# Patient Record
Sex: MALE | Race: WHITE | Employment: OTHER | URBAN - METROPOLITAN AREA
[De-identification: names, ages, dates, MRNs, and addresses within clinical notes are randomized per-mention and may not be internally consistent; named-entity substitution may affect disease eponyms.]

---

## 2022-09-16 ENCOUNTER — APPOINTMENT (OUTPATIENT)
Dept: GENERAL RADIOLOGY | Age: 82
DRG: 286 | End: 2022-09-16
Attending: EMERGENCY MEDICINE
Payer: MEDICARE

## 2022-09-16 ENCOUNTER — HOSPITAL ENCOUNTER (INPATIENT)
Age: 82
LOS: 5 days | Discharge: HOME | DRG: 286 | End: 2022-09-21
Attending: EMERGENCY MEDICINE | Admitting: INTERNAL MEDICINE
Payer: MEDICARE

## 2022-09-16 ENCOUNTER — APPOINTMENT (OUTPATIENT)
Dept: ULTRASOUND IMAGING | Age: 82
DRG: 286 | End: 2022-09-16
Attending: INTERNAL MEDICINE
Payer: MEDICARE

## 2022-09-16 DIAGNOSIS — I48.91 A-FIB (HCC): ICD-10-CM

## 2022-09-16 DIAGNOSIS — I48.91 ATRIAL FIBRILLATION WITH RAPID VENTRICULAR RESPONSE (HCC): Primary | ICD-10-CM

## 2022-09-16 DIAGNOSIS — I42.9 CARDIOMYOPATHY, UNSPECIFIED TYPE (HCC): ICD-10-CM

## 2022-09-16 LAB
ALBUMIN SERPL-MCNC: 3.8 G/DL (ref 3.5–5)
ALBUMIN/GLOB SERPL: 1.1 {RATIO} (ref 1.1–2.2)
ALP SERPL-CCNC: 82 U/L (ref 45–117)
ALT SERPL-CCNC: 249 U/L (ref 12–78)
ANION GAP SERPL CALC-SCNC: 8 MMOL/L (ref 5–15)
AST SERPL-CCNC: 86 U/L (ref 15–37)
BASOPHILS # BLD: 0.1 K/UL (ref 0–0.1)
BASOPHILS NFR BLD: 1 % (ref 0–1)
BILIRUB SERPL-MCNC: 1.2 MG/DL (ref 0.2–1)
BNP SERPL-MCNC: 3167 PG/ML
BUN SERPL-MCNC: 14 MG/DL (ref 6–20)
BUN/CREAT SERPL: 11 (ref 12–20)
CALCIUM SERPL-MCNC: 9.4 MG/DL (ref 8.5–10.1)
CHLORIDE SERPL-SCNC: 109 MMOL/L (ref 97–108)
CO2 SERPL-SCNC: 21 MMOL/L (ref 21–32)
COMMENT, HOLDF: NORMAL
COVID-19 RAPID TEST, COVR: NOT DETECTED
CREAT SERPL-MCNC: 1.28 MG/DL (ref 0.7–1.3)
DIFFERENTIAL METHOD BLD: ABNORMAL
EOSINOPHIL # BLD: 0.1 K/UL (ref 0–0.4)
EOSINOPHIL NFR BLD: 1 % (ref 0–7)
ERYTHROCYTE [DISTWIDTH] IN BLOOD BY AUTOMATED COUNT: 14.9 % (ref 11.5–14.5)
GLOBULIN SER CALC-MCNC: 3.6 G/DL (ref 2–4)
GLUCOSE SERPL-MCNC: 131 MG/DL (ref 65–100)
HCT VFR BLD AUTO: 45.1 % (ref 36.6–50.3)
HGB BLD-MCNC: 15.4 G/DL (ref 12.1–17)
IMM GRANULOCYTES # BLD AUTO: 0.1 K/UL (ref 0–0.04)
IMM GRANULOCYTES NFR BLD AUTO: 1 % (ref 0–0.5)
LYMPHOCYTES # BLD: 1.8 K/UL (ref 0.8–3.5)
LYMPHOCYTES NFR BLD: 15 % (ref 12–49)
MAGNESIUM SERPL-MCNC: 2.5 MG/DL (ref 1.6–2.4)
MCH RBC QN AUTO: 32 PG (ref 26–34)
MCHC RBC AUTO-ENTMCNC: 34.1 G/DL (ref 30–36.5)
MCV RBC AUTO: 93.8 FL (ref 80–99)
MONOCYTES # BLD: 1.2 K/UL (ref 0–1)
MONOCYTES NFR BLD: 10 % (ref 5–13)
NEUTS SEG # BLD: 9 K/UL (ref 1.8–8)
NEUTS SEG NFR BLD: 72 % (ref 32–75)
NRBC # BLD: 0 K/UL (ref 0–0.01)
NRBC BLD-RTO: 0 PER 100 WBC
PLATELET # BLD AUTO: 210 K/UL (ref 150–400)
PMV BLD AUTO: 10.8 FL (ref 8.9–12.9)
POTASSIUM SERPL-SCNC: 4 MMOL/L (ref 3.5–5.1)
PROT SERPL-MCNC: 7.4 G/DL (ref 6.4–8.2)
RBC # BLD AUTO: 4.81 M/UL (ref 4.1–5.7)
SAMPLES BEING HELD,HOLD: NORMAL
SODIUM SERPL-SCNC: 138 MMOL/L (ref 136–145)
SOURCE, COVRS: NORMAL
TROPONIN-HIGH SENSITIVITY: 36 NG/L (ref 0–76)
TSH SERPL DL<=0.05 MIU/L-ACNC: 9.66 UIU/ML (ref 0.36–3.74)
WBC # BLD AUTO: 12.1 K/UL (ref 4.1–11.1)

## 2022-09-16 PROCEDURE — 74011250636 HC RX REV CODE- 250/636: Performed by: EMERGENCY MEDICINE

## 2022-09-16 PROCEDURE — 96366 THER/PROPH/DIAG IV INF ADDON: CPT

## 2022-09-16 PROCEDURE — 93005 ELECTROCARDIOGRAM TRACING: CPT

## 2022-09-16 PROCEDURE — 36415 COLL VENOUS BLD VENIPUNCTURE: CPT

## 2022-09-16 PROCEDURE — 83735 ASSAY OF MAGNESIUM: CPT

## 2022-09-16 PROCEDURE — 85025 COMPLETE CBC W/AUTO DIFF WBC: CPT

## 2022-09-16 PROCEDURE — 74011250636 HC RX REV CODE- 250/636: Performed by: INTERNAL MEDICINE

## 2022-09-16 PROCEDURE — 74011000250 HC RX REV CODE- 250: Performed by: INTERNAL MEDICINE

## 2022-09-16 PROCEDURE — 99285 EMERGENCY DEPT VISIT HI MDM: CPT

## 2022-09-16 PROCEDURE — 80053 COMPREHEN METABOLIC PANEL: CPT

## 2022-09-16 PROCEDURE — 83880 ASSAY OF NATRIURETIC PEPTIDE: CPT

## 2022-09-16 PROCEDURE — 87635 SARS-COV-2 COVID-19 AMP PRB: CPT

## 2022-09-16 PROCEDURE — 74011000250 HC RX REV CODE- 250: Performed by: EMERGENCY MEDICINE

## 2022-09-16 PROCEDURE — 71045 X-RAY EXAM CHEST 1 VIEW: CPT

## 2022-09-16 PROCEDURE — 84484 ASSAY OF TROPONIN QUANT: CPT

## 2022-09-16 PROCEDURE — 84443 ASSAY THYROID STIM HORMONE: CPT

## 2022-09-16 PROCEDURE — 96365 THER/PROPH/DIAG IV INF INIT: CPT

## 2022-09-16 PROCEDURE — 96376 TX/PRO/DX INJ SAME DRUG ADON: CPT

## 2022-09-16 PROCEDURE — 76705 ECHO EXAM OF ABDOMEN: CPT

## 2022-09-16 PROCEDURE — 65270000046 HC RM TELEMETRY

## 2022-09-16 RX ORDER — SODIUM CHLORIDE 0.9 % (FLUSH) 0.9 %
5-40 SYRINGE (ML) INJECTION AS NEEDED
Status: DISCONTINUED | OUTPATIENT
Start: 2022-09-16 | End: 2022-09-21 | Stop reason: HOSPADM

## 2022-09-16 RX ORDER — ONDANSETRON 2 MG/ML
4 INJECTION INTRAMUSCULAR; INTRAVENOUS
Status: DISCONTINUED | OUTPATIENT
Start: 2022-09-16 | End: 2022-09-21 | Stop reason: HOSPADM

## 2022-09-16 RX ORDER — SODIUM CHLORIDE 0.9 % (FLUSH) 0.9 %
5-40 SYRINGE (ML) INJECTION EVERY 8 HOURS
Status: DISCONTINUED | OUTPATIENT
Start: 2022-09-16 | End: 2022-09-21 | Stop reason: HOSPADM

## 2022-09-16 RX ORDER — ACETAMINOPHEN 325 MG/1
650 TABLET ORAL
Status: DISCONTINUED | OUTPATIENT
Start: 2022-09-16 | End: 2022-09-21 | Stop reason: HOSPADM

## 2022-09-16 RX ORDER — POLYETHYLENE GLYCOL 3350 17 G/17G
17 POWDER, FOR SOLUTION ORAL DAILY PRN
Status: DISCONTINUED | OUTPATIENT
Start: 2022-09-16 | End: 2022-09-21 | Stop reason: HOSPADM

## 2022-09-16 RX ORDER — DILTIAZEM HYDROCHLORIDE 5 MG/ML
10 INJECTION INTRAVENOUS
Status: COMPLETED | OUTPATIENT
Start: 2022-09-16 | End: 2022-09-16

## 2022-09-16 RX ORDER — ONDANSETRON 4 MG/1
4 TABLET, ORALLY DISINTEGRATING ORAL
Status: DISCONTINUED | OUTPATIENT
Start: 2022-09-16 | End: 2022-09-21 | Stop reason: HOSPADM

## 2022-09-16 RX ORDER — ACETAMINOPHEN 650 MG/1
650 SUPPOSITORY RECTAL
Status: DISCONTINUED | OUTPATIENT
Start: 2022-09-16 | End: 2022-09-21 | Stop reason: HOSPADM

## 2022-09-16 RX ORDER — ENOXAPARIN SODIUM 100 MG/ML
40 INJECTION SUBCUTANEOUS DAILY
Status: DISCONTINUED | OUTPATIENT
Start: 2022-09-17 | End: 2022-09-17

## 2022-09-16 RX ADMIN — DEXTROSE MONOHYDRATE 15 MG/HR: 50 INJECTION, SOLUTION INTRAVENOUS at 22:52

## 2022-09-16 RX ADMIN — DILTIAZEM HYDROCHLORIDE 10 MG: 5 INJECTION, SOLUTION INTRAVENOUS at 18:24

## 2022-09-16 RX ADMIN — SODIUM CHLORIDE, PRESERVATIVE FREE 10 ML: 5 INJECTION INTRAVENOUS at 22:52

## 2022-09-16 RX ADMIN — DEXTROSE MONOHYDRATE 5 MG/HR: 50 INJECTION, SOLUTION INTRAVENOUS at 18:34

## 2022-09-16 RX ADMIN — SODIUM CHLORIDE 1000 ML: 9 INJECTION, SOLUTION INTRAVENOUS at 18:33

## 2022-09-16 NOTE — H&P
Hospitalist Admission Note    NAME: Jolie Rodriguez   :  1940   MRN:  [id number]     Date/Time:  2022 6:45 PM    Patient PCP: None  ______________________________________________________________________  Given the patient's current clinical presentation, I have a high level of concern for decompensation if discharged from the emergency department. Complex decision making was performed, which includes reviewing the patient's available past medical records, laboratory results, and x-ray films. My assessment of this patient's clinical condition and my plan of care is as follows. Assessment / Plan:  Atrial fibrillation with rapid ventricular rate new onset  -Initiate work-up for new onset A. fib  -Start Cardizem drip.  -Consult cardiology. Keep n.p.o. from midnight.  -Magnesium level is 2.5  -TSH level is elevated at 9.6    Elevated TSH concerning for hypothyroidism  -Check free T4 level. Further management based on that    Leukocytosis likely reactive  -Check procalcitonin. Check CBC. Abnormal chest x-ray  -Chest x-ray shows diffuse interstitial prominence which could represent chronic lung changes versus pulmonary congestion versus atypical infectious process  -Follow procalcitonin level. Check SARS-CoV-2. Elevated BNP, rule out congestive heart failure  -proBNP is 3000  -Follow results of echocardiogram        Code Status: Full code  Surrogate Decision Maker: Sister    DVT Prophylaxis: Lovenox      Baseline: From home, independent of ADLs      Subjective:   CHIEF COMPLAINT: Palpitations    HISTORY OF PRESENT ILLNESS:     Jolie Rodriguez is a 81 y.o.  male who presents with no significant past medical history is coming the hospital chief complaints of palpitations.   Patient reports that he has not seen a physician in the last 20 years and started having some palpitations which are irregular, fast etc. the thumping sensation in the chest.  Does not report any shortness of breath, cough or phlegm. Does not report any chest pain. Denies any syncopal episodes. Denies any abdominal pain, nausea or vomiting. On arrival to emergency department, he was noted to have fast heart rate and was in A. fib with RVR. Blood pressure was 148/119. We were asked to admit for work up and evaluation of the above problems. Past medical history  None    Past surgical history  None    Social History     Tobacco Use    Smoking status: Not on file    Smokeless tobacco: Not on file   Substance Use Topics    Alcohol use: Not on file   Denies smoking and socially drinks alcohol    Family history  No CAD in the family    No Known Allergies     Prior to Admission medications    Not on File       REVIEW OF SYSTEMS:     I am not able to complete the review of systems because:    The patient is intubated and sedated    The patient has altered mental status due to his acute medical problems    The patient has baseline aphasia from prior stroke(s)    The patient has baseline dementia and is not reliable historian    The patient is in acute medical distress and unable to provide information           Total of 12 systems reviewed as follows:       POSITIVE= underlined text  Negative = text not underlined  General:  fever, chills, sweats, generalized weakness, weight loss/gain,      loss of appetite   Eyes:    blurred vision, eye pain, loss of vision, double vision  ENT:    rhinorrhea, pharyngitis   Respiratory:   cough, sputum production, SOB, REED, wheezing, pleuritic pain   Cardiology:   chest pain, palpitations, orthopnea, PND, edema, syncope   Gastrointestinal:  abdominal pain , N/V, diarrhea, dysphagia, constipation, bleeding   Genitourinary:  frequency, urgency, dysuria, hematuria, incontinence   Muskuloskeletal :  arthralgia, myalgia, back pain  Hematology:  easy bruising, nose or gum bleeding, lymphadenopathy   Dermatological: rash, ulceration, pruritis, color change / jaundice  Endocrine:   hot flashes or polydipsia   Neurological:  headache, dizziness, confusion, focal weakness, paresthesia,     Speech difficulties, memory loss, gait difficulty  Psychological: Feelings of anxiety, depression, agitation    Objective:   VITALS:    Visit Vitals  BP (!) 158/97   Pulse (!) 138   Temp 97.4 °F (36.3 °C)   Resp (!) 31   Ht 6' (1.829 m)   Wt 111.1 kg (245 lb)   SpO2 (!) 89%   BMI 33.23 kg/m²       PHYSICAL EXAM:    General:    Alert, cooperative, no distress, appears stated age. HEENT: Atraumatic, anicteric sclerae, pink conjunctivae     No oral ulcers, mucosa moist, throat clear, dentition fair  Neck:  Supple, symmetrical,  thyroid: non tender  Lungs:   Clear to auscultation bilaterally. No Wheezing or Rhonchi. No rales. Chest wall:  No tenderness  No Accessory muscle use. Heart:   Irregular rhythm no  murmur   No edema  Abdomen:   Soft, non-tender. Not distended. Bowel sounds normal  Extremities: No cyanosis. No clubbing,      Skin turgor normal, Capillary refill normal, Radial dial pulse 2+  Skin:     Not pale. Not Jaundiced  No rashes   Psych:  Good insight. Not depressed. Not anxious or agitated. Neurologic: EOMs intact. No facial asymmetry. No aphasia or slurred speech. Symmetrical strength, Sensation grossly intact.  Alert and oriented X 4.     _______________________________________________________________________  Care Plan discussed with:    Comments   Patient y    Family      RN y    Care Manager                    Consultant:      _______________________________________________________________________  Expected  Disposition:   Home with Family y   HH/PT/OT/RN    SNF/LTC    YANN    ________________________________________________________________________  TOTAL TIME:  61  Minutes    Critical Care Provided     Minutes non procedure based      Comments    y Reviewed previous records   >50% of visit spent in counseling and coordination of care y Discussion with patient and/or family and questions answered       ________________________________________________________________________  Signed: Toro Hutson MD    Procedures: see electronic medical records for all procedures/Xrays and details which were not copied into this note but were reviewed prior to creation of Plan. LAB DATA REVIEWED:    Recent Results (from the past 24 hour(s))   EKG, 12 LEAD, INITIAL    Collection Time: 09/16/22  6:02 PM   Result Value Ref Range    Ventricular Rate 151 BPM    Atrial Rate 156 BPM    QRS Duration 114 ms    Q-T Interval 334 ms    QTC Calculation (Bezet) 529 ms    Calculated R Axis -12 degrees    Calculated T Axis -9 degrees    Diagnosis       Atrial fibrillation with rapid ventricular response with premature   ventricular or aberrantly conducted complexes  Incomplete right bundle branch block  Inferior infarct , age undetermined  No previous ECGs available     CBC WITH AUTOMATED DIFF    Collection Time: 09/16/22  6:20 PM   Result Value Ref Range    WBC 12.1 (H) 4.1 - 11.1 K/uL    RBC 4.81 4.10 - 5.70 M/uL    HGB 15.4 12.1 - 17.0 g/dL    HCT 45.1 36.6 - 50.3 %    MCV 93.8 80.0 - 99.0 FL    MCH 32.0 26.0 - 34.0 PG    MCHC 34.1 30.0 - 36.5 g/dL    RDW 14.9 (H) 11.5 - 14.5 %    PLATELET 496 933 - 351 K/uL    MPV 10.8 8.9 - 12.9 FL    NRBC 0.0 0  WBC    ABSOLUTE NRBC 0.00 0.00 - 0.01 K/uL    NEUTROPHILS 72 32 - 75 %    LYMPHOCYTES 15 12 - 49 %    MONOCYTES 10 5 - 13 %    EOSINOPHILS 1 0 - 7 %    BASOPHILS 1 0 - 1 %    IMMATURE GRANULOCYTES 1 (H) 0.0 - 0.5 %    ABS. NEUTROPHILS 9.0 (H) 1.8 - 8.0 K/UL    ABS. LYMPHOCYTES 1.8 0.8 - 3.5 K/UL    ABS. MONOCYTES 1.2 (H) 0.0 - 1.0 K/UL    ABS. EOSINOPHILS 0.1 0.0 - 0.4 K/UL    ABS. BASOPHILS 0.1 0.0 - 0.1 K/UL    ABS. IMM.  GRANS. 0.1 (H) 0.00 - 0.04 K/UL    DF AUTOMATED     SAMPLES BEING HELD    Collection Time: 09/16/22  6:20 PM   Result Value Ref Range    SAMPLES BEING HELD SST     COMMENT        Add-on orders for these samples will be processed based on acceptable specimen integrity and analyte stability, which may vary by analyte.

## 2022-09-16 NOTE — Clinical Note
Aspirin Registry Question:   Aspirin was not given and not discussed with physician prior to the procedure.

## 2022-09-16 NOTE — ED NOTES
Arrives via em[facility] for rapid heart rate (120-170). Reports general fatigue this week. Pt has not seen a physician in approx 20 years. EKG by EMS reads a-fib w RVR, no hx of this. Pt denies CP, mild SOB w exertion. Pt placed on monitor x3, call light in reach.     [phone] pt noted to be hypoxic on RA, placed on 2l NC

## 2022-09-16 NOTE — ED NOTES
Bedside and Verbal shift change report given to [address] (oncoming nurse) by Marcelino Torre (offgoing nurse). Report included the following information SBAR, Kardex, ED Summary, [facility] and Recent Results.

## 2022-09-16 NOTE — Clinical Note
TRANSFER - OUT REPORT:     Verbal report given to: [address]. Report consisted of patient's Situation, Background, Assessment and   Recommendations(SBAR). Opportunity for questions and clarification was provided. Patient transported with a Registered Nurse. Patient transported to: [facility].

## 2022-09-16 NOTE — ED PROVIDER NOTES
EMERGENCY DEPARTMENT HISTORY AND PHYSICAL EXAM      Date: 9/16/2022  Patient Name: Ed Cm    History of Presenting Illness     Chief Complaint   Patient presents with    Rapid Heart Rate     Arrives via ems from [facility] for rapid heart rate (120-170). Reports general fatigue this week. Pt has not seen a physician in approx 20 years. EKG by EMS reads a-fib w RVR, no hx of this. Pt denies CP       History Provided By: Patient    HPI: Ed Cm, 81 y.o. male  presents to the ED with cc of generalized weakness and fatigue. Patient states for about a week he has been very fatigued and gets exhausted and tired. He has some shortness of breath with exertion. No chest pain. No palpitations. No nausea vomiting or diarrhea. Patient felt like he may just have a viral illness and presented to urgent care. There they did an EKG that showed new onset atrial fibrillation with a rapid ventricular rate. Patient states he has not seen a PCP in 20 years. He has no known medical problems. Currently not on any medications other than vitamins. Past History     Past Medical History:  No past medical history on file. Past Surgical History:  No past surgical history on file. Medications:  No current facility-administered medications on file prior to encounter. No current outpatient medications on file prior to encounter. Family History:  No family history on file. Social History: Allergies:  No Known Allergies    All the above components of the past  history are auto-populated from the electronic record. They have been reviewed and the patient has been interviewed for any pertinent past history that pertains to the patient's chief complaint and reason for visit. Not all pre-populated components may be accurate at the time this note was generated. Review of Systems   Review of Systems   Constitutional:  Positive for fatigue. Negative for chills and fever.    HENT:  Negative for congestion, ear pain, rhinorrhea, sore throat and trouble swallowing. Eyes:  Negative for visual disturbance. Respiratory:  Positive for shortness of breath. Negative for cough and chest tightness. Cardiovascular:  Negative for chest pain and palpitations. Gastrointestinal:  Negative for abdominal pain, blood in stool, constipation, diarrhea, nausea and vomiting. Genitourinary:  Negative for decreased urine volume, difficulty urinating, dysuria and frequency. Musculoskeletal:  Negative for back pain and neck pain. Skin:  Negative for color change and rash. Neurological:  Positive for weakness. Negative for dizziness, light-headedness and headaches. Physical Exam   Physical Exam  Vitals and nursing note reviewed. Constitutional:       General: He is not in acute distress. Appearance: He is well-developed. He is not ill-appearing. HENT:      Head: Normocephalic. Eyes:      Conjunctiva/sclera: Conjunctivae normal.   Cardiovascular:      Rate and Rhythm: Tachycardia present. Rhythm irregular. Pulmonary:      Effort: Pulmonary effort is normal. No accessory muscle usage or respiratory distress. Abdominal:      General: There is no distension. Musculoskeletal:      Cervical back: Normal range of motion. Skin:     General: Skin is warm and dry. Neurological:      Mental Status: He is alert and oriented to person, place, and time.        Diagnostic Study Results     Labs -     Recent Results (from the past 24 hour(s))   EKG, 12 LEAD, INITIAL    Collection Time: 09/16/22  6:02 PM   Result Value Ref Range    Ventricular Rate 151 BPM    Atrial Rate 156 BPM    QRS Duration 114 ms    Q-T Interval 334 ms    QTC Calculation (Bezet) 529 ms    Calculated R Axis -12 degrees    Calculated T Axis -9 degrees    Diagnosis       Atrial fibrillation with rapid ventricular response with premature   ventricular or aberrantly conducted complexes  Incomplete right bundle branch block  Inferior infarct , age undetermined  No previous ECGs available     CBC WITH AUTOMATED DIFF    Collection Time: 09/16/22  6:20 PM   Result Value Ref Range    WBC 12.1 (H) 4.1 - 11.1 K/uL    RBC 4.81 4.10 - 5.70 M/uL    HGB 15.4 12.1 - 17.0 g/dL    HCT 45.1 36.6 - 50.3 %    MCV 93.8 80.0 - 99.0 FL    MCH 32.0 26.0 - 34.0 PG    MCHC 34.1 30.0 - 36.5 g/dL    RDW 14.9 (H) 11.5 - 14.5 %    PLATELET 607 215 - 594 K/uL    MPV 10.8 8.9 - 12.9 FL    NRBC 0.0 0  WBC    ABSOLUTE NRBC 0.00 0.00 - 0.01 K/uL    NEUTROPHILS 72 32 - 75 %    LYMPHOCYTES 15 12 - 49 %    MONOCYTES 10 5 - 13 %    EOSINOPHILS 1 0 - 7 %    BASOPHILS 1 0 - 1 %    IMMATURE GRANULOCYTES 1 (H) 0.0 - 0.5 %    ABS. NEUTROPHILS 9.0 (H) 1.8 - 8.0 K/UL    ABS. LYMPHOCYTES 1.8 0.8 - 3.5 K/UL    ABS. MONOCYTES 1.2 (H) 0.0 - 1.0 K/UL    ABS. EOSINOPHILS 0.1 0.0 - 0.4 K/UL    ABS. BASOPHILS 0.1 0.0 - 0.1 K/UL    ABS. IMM. GRANS. 0.1 (H) 0.00 - 0.04 K/UL    DF AUTOMATED     METABOLIC PANEL, COMPREHENSIVE    Collection Time: 09/16/22  6:20 PM   Result Value Ref Range    Sodium 138 136 - 145 mmol/L    Potassium 4.0 3.5 - 5.1 mmol/L    Chloride 109 (H) 97 - 108 mmol/L    CO2 21 21 - 32 mmol/L    Anion gap 8 5 - 15 mmol/L    Glucose 131 (H) 65 - 100 mg/dL    BUN 14 6 - 20 MG/DL    Creatinine 1.28 0.70 - 1.30 MG/DL    BUN/Creatinine ratio 11 (L) 12 - 20      GFR est AA >60 >60 ml/min/1.73m2    GFR est non-AA 54 (L) >60 ml/min/1.73m2    Calcium 9.4 8.5 - 10.1 MG/DL    Bilirubin, total 1.2 (H) 0.2 - 1.0 MG/DL    ALT (SGPT) 249 (H) 12 - 78 U/L    AST (SGOT) 86 (H) 15 - 37 U/L    Alk.  phosphatase 82 45 - 117 U/L    Protein, total 7.4 6.4 - 8.2 g/dL    Albumin 3.8 3.5 - 5.0 g/dL    Globulin 3.6 2.0 - 4.0 g/dL    A-G Ratio 1.1 1.1 - 2.2     MAGNESIUM    Collection Time: 09/16/22  6:20 PM   Result Value Ref Range    Magnesium 2.5 (H) 1.6 - 2.4 mg/dL   TSH 3RD GENERATION    Collection Time: 09/16/22  6:20 PM   Result Value Ref Range    TSH 9.66 (H) 0.36 - 3.74 uIU/mL   SAMPLES BEING HELD Collection Time: 09/16/22  6:20 PM   Result Value Ref Range    SAMPLES BEING HELD SST     COMMENT        Add-on orders for these samples will be processed based on acceptable specimen integrity and analyte stability, which may vary by analyte. Radiologic Studies -   XR CHEST PORT   Final Result   1. Diffuse interstitial prominence which could represent chronic lung changes,   pulmonary vascular congestion and/or atypical infectious process. CT Results  (Last 48 hours)      None          CXR Results  (Last 48 hours)                 09/16/22 1857  XR CHEST PORT Final result    Impression:  1. Diffuse interstitial prominence which could represent chronic lung changes,   pulmonary vascular congestion and/or atypical infectious process. Narrative:  INDICATION: . new onset a-fib   Additional history:   COMPARISON: None. LIMITATIONS: Portable technique. Dede Money FINDINGS: Single frontal view of the chest.    .   Lines/tubes/surgical: Cardiac monitor leads overly the patient. Heart/mediastinum: Unremarkable. Lungs/pleura: Diffuse interstitial prominence. No visualized pleural effusion or   pneumothorax. Additional Comments: None. .                 Medical Decision Making     I reviewed the vital signs, available nursing notes, past medical history, past surgical history, family history and social history. Vital Signs-I have reviewed the vital signs that have been made available during the patient's emergency department visit. The vital signs auto-populated below are obtained mostly by electronic means through monitoring devices that have been downloaded into the patient's chart by the nursing staff. Some vital signs are not downloaded into the chart until after the patient has been discharged and this note has been completed, therefore some vital signs may not be available to the physician for review prior to patient's discharge or admission.   The physician has reviewed the patient's triage vital signs, monitored the electronic monitoring devices remotely for any significant vital sign abnormalities, and have reviewed vital signs prior to discharge. Some vital signs reviewed at bedside or remotely utilizing electronic monitoring devices may be different than the vital signs downloaded into the electronic medical record. Some vital signs may be erroneous and inaccurate since they are obtained by electronic monitoring devices, and not all vital signs are verified for accuracy by nursing staff prior to downloading into the patient's chart. Patient Vitals for the past 24 hrs:   Temp Pulse Resp BP SpO2   09/16/22 1915 -- (!) 121 23 -- 91 %   09/16/22 1900 -- (!) 147 27 (!) 159/117 91 %   09/16/22 1850 -- (!) 141 25 (!) 146/103 93 %   09/16/22 1836 -- (!) 138 (!) 31 (!) 158/97 (!) 89 %   09/16/22 1828 -- -- -- -- 91 %   09/16/22 1800 97.4 °F (36.3 °C) (!) 164 20 (!) 148/119 93 %         Records Reviewed: Nursing notes for today's visit have been reviewed. I have also reviewed most recent medical records pertinent to today's complaints, if available in our medical record system. I have also reviewed all labs and imaging results from previous results in comparison to results obtained today. If an EKG was obtained today, it has been compared to previous EKGs, if available. If arriving via EMS, the EMS report has been reviewed if made available to us within the patient's time in the emergency department. ED Course and Medical Decision Making:       MDM  Number of Diagnoses or Management Options  Atrial fibrillation with rapid ventricular response ([facility] [address]  Diagnosis management comments: Patient with no recent primary care presents with fatigue and atrial fibrillation with rapid ventricular rate. Patient's probably been in A. fib for some time. He appears based on his chest imaging and symptoms to maybe have a little bit of heart failure. No signs of ischemia.   We will rate control with diltiazem bolus followed by titratable infusion. Metabolic panel appears within acceptable ranges. No signs of hyperthyroidism. Will admit to hospitalist service with cardiology evaluation tomorrow. Amount and/or Complexity of Data Reviewed  Clinical lab tests: ordered and reviewed  Tests in the radiology section of CPT®: ordered and reviewed  Review and summarize past medical records: yes  Discuss the patient with other providers: yes  Independent visualization of images, tracings, or specimens: yes    Risk of Complications, Morbidity, and/or Mortality  Presenting problems: moderate  Diagnostic procedures: low  Management options: moderate    Patient Progress  Patient progress: improved           Orders Placed This Encounter    XR CHEST PORT    CBC WITH AUTOMATED DIFF    COMPREHENSIVE METABOLIC PANEL    MAGNESIUM    TROPONIN-HIGH SENSITIVITY    TSH 3RD GENERATION    SAMPLES BEING HELD    PRO-BNP    EKG, 12 LEAD, INITIAL    SALINE LOCK IV ONE TIME STAT    sodium chloride 0.9 % bolus infusion 1,000 mL    DISCONTD: dilTIAZem (CARDIZEM) 100 mg in 0.9% sodium chloride (MBP/ADV) 100 mL infusion    dilTIAZem (CARDIZEM) injection 10 mg    dilTIAZem (CARDIZEM) 100 mg in dextrose 5% (MBP/ADV) 100 mL infusion       EKG    Date/Time: 9/16/2022 6:02 PM  Performed by: Nicolas Rawls MD  Authorized by: Nicolas Rawls MD     ECG reviewed by ED Physician in the absence of a cardiologist: yes    Rate:     ECG rate:  151    ECG rate assessment: tachycardic    Rhythm:     Rhythm: atrial fibrillation    Ectopy:     Ectopy: PVCs    QRS:     QRS axis:  Normal    QRS intervals: Wide    QRS conduction: RBBB      Details:  Incomplete right bundle branch block  ST segments:     ST segments:  Normal  T waves:     T waves: non-specific        Critical Care Time:   I have spent 40 minutes of critical care time in evaluating and treating this patient.   This includes time spent at bedside, time with family and decision makers, documentation, review of labs and imaging, and/or consultation with specialists. It does not include time spent on separately billed procedures. This patient presents with a critical illness or injury that acutely impairs one or more vital organ systems such that there is a high probability of imminent or life threatening deterioration in the patient's condition. This case involved decision making of high complexity to assess, manipulate, and support vital organ system failure and/or to prevent further life threatening deterioration of the patient's condition. Failure to initiate these interventions on an urgent basis would likely result in sudden, clinically significant or life threatening deterioration in the patient's condition. Abnormal findings supporting critical care: Atrial fibrillation with rapid ventricular rate  Interventions to support critical care: Rate controlling IV medications, titratable  Failure to intervene may result in: Cardiac failure      Disposition:  Admit      Diagnosis     Clinical Impression:   1.  Atrial fibrillation with rapid ventricular response ([address]

## 2022-09-17 ENCOUNTER — APPOINTMENT (OUTPATIENT)
Dept: CT IMAGING | Age: 82
DRG: 286 | End: 2022-09-17
Attending: GENERAL ACUTE CARE HOSPITAL
Payer: MEDICARE

## 2022-09-17 LAB
ALBUMIN SERPL-MCNC: 3.6 G/DL (ref 3.5–5)
ALBUMIN/GLOB SERPL: 1.1 {RATIO} (ref 1.1–2.2)
ALP SERPL-CCNC: 72 U/L (ref 45–117)
ALT SERPL-CCNC: 209 U/L (ref 12–78)
AMPHET UR QL SCN: NEGATIVE
ANION GAP SERPL CALC-SCNC: 8 MMOL/L (ref 5–15)
APPEARANCE UR: CLEAR
AST SERPL-CCNC: 68 U/L (ref 15–37)
BACTERIA URNS QL MICRO: NEGATIVE /HPF
BARBITURATES UR QL SCN: NEGATIVE
BENZODIAZ UR QL: NEGATIVE
BILIRUB SERPL-MCNC: 1.6 MG/DL (ref 0.2–1)
BILIRUB UR QL: NEGATIVE
BUN SERPL-MCNC: 15 MG/DL (ref 6–20)
BUN/CREAT SERPL: 12 (ref 12–20)
CALCIUM SERPL-MCNC: 9.2 MG/DL (ref 8.5–10.1)
CANNABINOIDS UR QL SCN: NEGATIVE
CHLORIDE SERPL-SCNC: 109 MMOL/L (ref 97–108)
CO2 SERPL-SCNC: 24 MMOL/L (ref 21–32)
COCAINE UR QL SCN: NEGATIVE
COLOR UR: ABNORMAL
CREAT SERPL-MCNC: 1.27 MG/DL (ref 0.7–1.3)
D DIMER PPP FEU-MCNC: 1.93 MG/L FEU (ref 0–0.65)
DRUG SCRN COMMENT,DRGCM: NORMAL
EPITH CASTS URNS QL MICRO: ABNORMAL /LPF
ERYTHROCYTE [DISTWIDTH] IN BLOOD BY AUTOMATED COUNT: 15.2 % (ref 11.5–14.5)
GLOBULIN SER CALC-MCNC: 3.4 G/DL (ref 2–4)
GLUCOSE SERPL-MCNC: 129 MG/DL (ref 65–100)
GLUCOSE UR STRIP.AUTO-MCNC: NEGATIVE MG/DL
HAV IGM SER QL: NONREACTIVE
HBV CORE IGM SER QL: NONREACTIVE
HBV SURFACE AG SER QL: <0.1 INDEX
HBV SURFACE AG SER QL: NEGATIVE
HCT VFR BLD AUTO: 44.4 % (ref 36.6–50.3)
HCV AB SERPL QL IA: NONREACTIVE
HGB BLD-MCNC: 14.8 G/DL (ref 12.1–17)
HGB UR QL STRIP: NEGATIVE
HYALINE CASTS URNS QL MICRO: ABNORMAL /LPF (ref 0–2)
KETONES UR QL STRIP.AUTO: 15 MG/DL
LEUKOCYTE ESTERASE UR QL STRIP.AUTO: NEGATIVE
MAGNESIUM SERPL-MCNC: 2.4 MG/DL (ref 1.6–2.4)
MCH RBC QN AUTO: 31.5 PG (ref 26–34)
MCHC RBC AUTO-ENTMCNC: 33.3 G/DL (ref 30–36.5)
MCV RBC AUTO: 94.5 FL (ref 80–99)
METHADONE UR QL: NEGATIVE
NITRITE UR QL STRIP.AUTO: NEGATIVE
NRBC # BLD: 0 K/UL (ref 0–0.01)
NRBC BLD-RTO: 0 PER 100 WBC
OPIATES UR QL: NEGATIVE
PCP UR QL: NEGATIVE
PH UR STRIP: 5.5 [PH] (ref 5–8)
PLATELET # BLD AUTO: 202 K/UL (ref 150–400)
PMV BLD AUTO: 11.2 FL (ref 8.9–12.9)
POTASSIUM SERPL-SCNC: 3.8 MMOL/L (ref 3.5–5.1)
PROCALCITONIN SERPL-MCNC: 0.08 NG/ML
PROT SERPL-MCNC: 7 G/DL (ref 6.4–8.2)
PROT UR STRIP-MCNC: ABNORMAL MG/DL
RBC # BLD AUTO: 4.7 M/UL (ref 4.1–5.7)
RBC #/AREA URNS HPF: ABNORMAL /HPF (ref 0–5)
SARS-COV-2, COV2: NORMAL
SODIUM SERPL-SCNC: 141 MMOL/L (ref 136–145)
SP GR UR REFRACTOMETRY: 1.01
SP1: NORMAL
SP2: NORMAL
SP3: NORMAL
T4 FREE SERPL-MCNC: 1.1 NG/DL (ref 0.8–1.5)
TROPONIN-HIGH SENSITIVITY: 45 NG/L (ref 0–76)
UA: UC IF INDICATED,UAUC: ABNORMAL
UROBILINOGEN UR QL STRIP.AUTO: 0.2 EU/DL (ref 0.2–1)
WBC # BLD AUTO: 15.9 K/UL (ref 4.1–11.1)
WBC URNS QL MICRO: ABNORMAL /HPF (ref 0–4)

## 2022-09-17 PROCEDURE — 74011250637 HC RX REV CODE- 250/637: Performed by: STUDENT IN AN ORGANIZED HEALTH CARE EDUCATION/TRAINING PROGRAM

## 2022-09-17 PROCEDURE — 80053 COMPREHEN METABOLIC PANEL: CPT

## 2022-09-17 PROCEDURE — U0005 INFEC AGEN DETEC AMPLI PROBE: HCPCS

## 2022-09-17 PROCEDURE — 85379 FIBRIN DEGRADATION QUANT: CPT

## 2022-09-17 PROCEDURE — 84439 ASSAY OF FREE THYROXINE: CPT

## 2022-09-17 PROCEDURE — 80074 ACUTE HEPATITIS PANEL: CPT

## 2022-09-17 PROCEDURE — 84145 PROCALCITONIN (PCT): CPT

## 2022-09-17 PROCEDURE — 65270000046 HC RM TELEMETRY

## 2022-09-17 PROCEDURE — 36415 COLL VENOUS BLD VENIPUNCTURE: CPT

## 2022-09-17 PROCEDURE — 74011250636 HC RX REV CODE- 250/636: Performed by: INTERNAL MEDICINE

## 2022-09-17 PROCEDURE — 77010033678 HC OXYGEN DAILY

## 2022-09-17 PROCEDURE — 84484 ASSAY OF TROPONIN QUANT: CPT

## 2022-09-17 PROCEDURE — 74011250636 HC RX REV CODE- 250/636: Performed by: STUDENT IN AN ORGANIZED HEALTH CARE EDUCATION/TRAINING PROGRAM

## 2022-09-17 PROCEDURE — 81001 URINALYSIS AUTO W/SCOPE: CPT

## 2022-09-17 PROCEDURE — 80307 DRUG TEST PRSMV CHEM ANLYZR: CPT

## 2022-09-17 PROCEDURE — 85027 COMPLETE CBC AUTOMATED: CPT

## 2022-09-17 PROCEDURE — 74011000250 HC RX REV CODE- 250: Performed by: INTERNAL MEDICINE

## 2022-09-17 PROCEDURE — 74011250636 HC RX REV CODE- 250/636: Performed by: GENERAL ACUTE CARE HOSPITAL

## 2022-09-17 PROCEDURE — 83735 ASSAY OF MAGNESIUM: CPT

## 2022-09-17 PROCEDURE — 71250 CT THORAX DX C-: CPT

## 2022-09-17 RX ORDER — FUROSEMIDE 10 MG/ML
20 INJECTION INTRAMUSCULAR; INTRAVENOUS ONCE
Status: COMPLETED | OUTPATIENT
Start: 2022-09-17 | End: 2022-09-17

## 2022-09-17 RX ORDER — ENOXAPARIN SODIUM 100 MG/ML
1 INJECTION SUBCUTANEOUS DAILY
Status: DISCONTINUED | OUTPATIENT
Start: 2022-09-17 | End: 2022-09-17

## 2022-09-17 RX ORDER — DILTIAZEM HYDROCHLORIDE 30 MG/1
30 TABLET, FILM COATED ORAL
Status: DISCONTINUED | OUTPATIENT
Start: 2022-09-17 | End: 2022-09-18

## 2022-09-17 RX ORDER — ENOXAPARIN SODIUM 150 MG/ML
1 INJECTION SUBCUTANEOUS EVERY 12 HOURS
Status: DISCONTINUED | OUTPATIENT
Start: 2022-09-17 | End: 2022-09-21

## 2022-09-17 RX ADMIN — ENOXAPARIN SODIUM 120 MG: 150 INJECTION SUBCUTANEOUS at 16:43

## 2022-09-17 RX ADMIN — DILTIAZEM HYDROCHLORIDE 30 MG: 30 TABLET, FILM COATED ORAL at 19:07

## 2022-09-17 RX ADMIN — SODIUM CHLORIDE, PRESERVATIVE FREE 10 ML: 5 INJECTION INTRAVENOUS at 14:00

## 2022-09-17 RX ADMIN — SODIUM CHLORIDE, PRESERVATIVE FREE 10 ML: 5 INJECTION INTRAVENOUS at 05:21

## 2022-09-17 RX ADMIN — DEXTROSE MONOHYDRATE 10 MG/HR: 50 INJECTION, SOLUTION INTRAVENOUS at 08:32

## 2022-09-17 RX ADMIN — DILTIAZEM HYDROCHLORIDE 30 MG: 30 TABLET, FILM COATED ORAL at 21:26

## 2022-09-17 RX ADMIN — FUROSEMIDE 20 MG: 10 INJECTION, SOLUTION INTRAMUSCULAR; INTRAVENOUS at 19:07

## 2022-09-17 RX ADMIN — SODIUM CHLORIDE, PRESERVATIVE FREE 10 ML: 5 INJECTION INTRAVENOUS at 21:26

## 2022-09-17 NOTE — ED NOTES
TRANSITION OF CARE - SBAR OUT    Patient is being transferred to [facility], Room# 2272. Report GIVEN TO Marychuy Thompson RN on [facility] for routine progression of care. Report is consisted of the following information SBAR, ED Summary, MAR, and Cardiac Rhythm Afib . Patient transferred to receiving unit by: Abi Hunter RN     Called outstanding consults: No  Routine consults pending  Collected routine labs: No pending for AM    All current orders reviewed with accepting nurse: Yes    The following personal items will be sent with the patient during transfer to the floor:   All valuables:      Visual Aid: None                   CARDIAC MONITORING ORDERED: Yes     The following CURRENT information were reported to the receiving RN:    CODE STATUS: Full Code    NIH SCORE:    ADITI SCREENING:      NEURO ASSESSMENT:        RESTRAINTS IN USE: No      IS DOCUMENTATION COMPLETE: Yes      Vital Signs  Level of Consciousness: Alert (0) (09/16/22 2115)  Temp: 97.9 °F (36.6 °C) (09/16/22 2115)  Temp Source: Oral (09/16/22 2115)  Pulse (Heart Rate): (!) 105 (09/16/22 2115)  Heart Rate Source: Monitor (09/16/22 2115)  Cardiac Rhythm: Atrial Fib (09/16/22 2115)  Resp Rate: 19 (09/16/22 2115)  BP: (!) 125/92 (09/16/22 2115)  MAP (Monitor): 103 (09/16/22 2115)  MAP (Calculated): 103 (09/16/22 2115)  BP 1 Location: Right upper arm (09/16/22 2115)  BP 1 Method: Automatic (09/16/22 2115)  BP Patient Position: At rest, Sitting (09/16/22 2115)  MEWS Score: 2 (09/16/22 2115)  Pain 1  Pain Scale 1: Numeric (0 - 10) (09/16/22 2115)  Pain Intensity 1: 0 (09/16/22 2115)      OXYGEN: Oxygen Therapy  O2 Device: Nasal cannula (09/16/22 2115)  O2 Flow Rate (L/min): 2 l/min (09/16/22 2115)    KINDER FALL ASSESSMENT:  Presents to emergency department  because of falls (Syncope, seizure, or loss of consciousness): No, Age > 79: Yes, Altered Mental Status, Intoxication with alcohol or substance confusion (Disorientation, impaired judgment, poor safety awaremess, or inability to follow instructions): No, Impaired Mobility: Ambulates or transfers with assistive devices or assistance; Unable to ambulate or transer.: No, Nursing Judgement : No    WOUNDS: No      URINARY CATHETER: voiding    LINE ACCESS:   Peripheral IV 09/16/22 Left Forearm (Active)       Peripheral IV 09/16/22 Distal;Posterior;Right Forearm (Active)        Opportunity for questions and clarification were provided.   Tri Mckinnon RN

## 2022-09-17 NOTE — CONSULTS
IP Cardiology Consult       Date of consult:  09/17/22  Date of admission: 9/16/2022  Primary Cardiologist: janessa  Physician Requesting consult: Dr. Millicent Danielle:    Problem list:   Atrial fibrillation with rapid ventricular rate, unknown duration  Hypothyroidism, elevated TSH 9.6, T4 within normal limit  Elevated BNP, dyspnea at night times, mild acute on chronic congestive heart failure with rapid ventricular rate  Leukocytosis       Recommendations: We will start him on diltiazem p.o., he is weaned off of diltiazem drip  Continue anticoagulation, discussed risk and benefit of anticoagulation  We discussed about risk and benefit of rate versus rhythm control, he agreed to have rhythm control attempt, will plan for RONALD and cardioversion on Monday  Will give a dose of Lasix    Thank you for this consult and allowing me to take part in this patients care. Please call with questions. [x]        High complexity decision making was performed      CC / Reason for consult: Atrial fibrillation    History of the presenting illness:  Humberto Gutiérrez is a 81 y.o. male with no significant past medical history presented with not feeling well. He says few days ago at night he was restless, was not feeling well, some shortness of breath, denied any palpitations. He went to urgent care to get evaluated for COVID. He was noted to have elevated heart rate and EKG showed A. fib with RVR. He was sent to emergency room. He was placed on diltiazem drip but now he is off of diltiazem drip. His heart rate is 100s.     Past medical history: No significant past medical history, has not seen physician for quite some time    Family history: No family history of premature CAD      Social History     Socioeconomic History    Marital status: SINGLE     Spouse name: Not on file    Number of children: Not on file    Years of education: Not on file    Highest education level: Not on file   Occupational History Not on file   Tobacco Use    Smoking status: Not on file    Smokeless tobacco: Not on file   Substance and Sexual Activity    Alcohol use: Not on file    Drug use: Not on file    Sexual activity: Not on file   Other Topics Concern    Not on file   Social History Narrative    Not on file     Social Determinants of Health     Financial Resource Strain: Not on file   Food Insecurity: Not on file   Transportation Needs: Not on file   Physical Activity: Not on file   Stress: Not on file   Social Connections: Not on file   Intimate Partner Violence: Not on file   Housing Stability: Not on file         ROS      Total of 12 systems reviewed, all systems review was negative except Pertinent Positives included in HPI     Visit Vitals  /78 (BP 1 Location: Right upper arm, BP Patient Position: At rest)   Pulse 98   Temp 98.2 °F (36.8 °C)   Resp 18   Ht 6' (1.829 m)   Wt 111.1 kg (245 lb)   SpO2 95%   BMI 33.23 kg/m²       Physical Exam  Examination:     General: Alert + Oriented x3, no acute distress   HEENT: Normocephalic aromatic, MMM   Neck: Supple, JVP- not well appreciated   RS: Non labored, clear   CVS: Irregular rate and rhythm, S1S2, no murmur   Abd: Soft, non tender, non distended   Lower extremity: Warm to touch, Edema- None   Skin: Warm and dry, No significant bruises or rash   CNS: Oriented x3, no focal neuro deficit     Lab review:  BMP:   Lab Results   Component Value Date/Time     09/17/2022 12:46 AM    K 3.8 09/17/2022 12:46 AM     (H) 09/17/2022 12:46 AM    CO2 24 09/17/2022 12:46 AM    AGAP 8 09/17/2022 12:46 AM     (H) 09/17/2022 12:46 AM    BUN 15 09/17/2022 12:46 AM    CREA 1.27 09/17/2022 12:46 AM    GFRAA >60 09/17/2022 12:46 AM    GFRNA 54 (L) 09/17/2022 12:46 AM        CBC:  Lab Results   Component Value Date/Time    WBC 15.9 (H) 09/17/2022 12:46 AM    HGB 14.8 09/17/2022 12:46 AM    HCT 44.4 09/17/2022 12:46 AM    PLATELET 664 31/36/5321 12:46 AM    MCV 94.5 09/17/2022 12:46 AM       All Cardiac Markers in the last 24 hours:    Lab Results   Component Value Date/Time    BNPNT 3,167 (H) 09/16/2022 06:20 PM       Data review:    Tele:  Atrial fibrillation    EKG tracing personally reviewed:   Atrial fibrillation with rapid ventricular rate    Echocardiogram:    Other cardiac testing:    Other imaging:        Signed:  Joséu Bui MD  Interventional Cardiology  9/17/2022

## 2022-09-17 NOTE — PROGRESS NOTES
Hospitalist Progress Note    NAME: Juliet Baird   :  1940   MRN:  [id number]       Assessment / Plan:    Atrial fibrillation with rapid ventricular rate new onset  -Initiate work-up for new onset A. Fib  -CHADSVASC score 2  -Start Cardizem drip.  -Start full dose lovenox   -Consult cardiology. -Magnesium level is 2.5  -Check urine drug screen  -TSH level is elevated at 9.6, T4 wnl. Re check TFT in 4 weeks    Elevated TSH concerning for hypothyroidism  --TSH level is elevated at 9.6, T4 wnl. Re check TFT in 4 weeks     Abnormal chest x-ray  Acute hypoxic resp failure   Leukocytosis, worsening   Elevated BNP, rule out congestive heart failure  -proBNP is 3000  -Follow results of echocardiogram  -rapid covid neg  -Chest x-ray shows diffuse interstitial prominence which could represent chronic lung changes versus pulmonary congestion versus atypical infectious process  -check procalcitonin level.    -Check COVID PCR  -Dry CT chest ordered  -D Dimer  -If above w/up is neg then order autoimmune w/up      Elevated LFTs  US and hepatitis panel neg  -If above w/up is neg then order autoimmune w/up   ? Viral infection      Code Status: Full code  Surrogate Decision Maker: Sister  DVT Prophylaxis: Lovenox   Baseline: From home, independent of ADLs  Recommended Disposition: Home w/Family  Anticipated Discharge Date:  48 hours        Subjective:     Discussed with RN events overnight. No specific complaints  No cough/SOB  No CP  No fever  No leg swelling   No abd pain/diarrhea     Review of Systems:  Symptom Y/N Comments  Symptom Y/N Comments   Fever/Chills    Chest Pain     Poor Appetite    Edema     Cough    Abdominal Pain     Sputum    Joint Pain     SOB/REED    Pruritis/Rash     Nausea/vomit    Tolerating PT/OT     Diarrhea    Tolerating Diet     Constipation    Other       PO intake: No data found.     Wt Readings from Last 10 Encounters:   09/16/22 111.1 kg (245 lb)       Objective:     VITALS:   Last 24hrs VS reviewed since prior progress note. Most recent are:  Patient Vitals for the past 24 hrs:   Temp Pulse Resp BP SpO2   09/17/22 0730 97.7 °F (36.5 °C) 92 18 136/68 95 %   09/17/22 0300 97.8 °F (36.6 °C) 71 14 (!) 102/49 94 %   09/16/22 2227 98 °F (36.7 °C) (!) 107 21 134/73 94 %   09/16/22 2115 97.9 °F (36.6 °C) (!) 105 19 (!) 125/92 93 %   09/16/22 2045 -- (!) 110 27 (!) 151/95 93 %   09/16/22 2015 -- (!) 114 18 134/81 92 %   09/16/22 1953 -- (!) 130 26 135/85 92 %   09/16/22 1938 -- (!) 128 24 (!) 147/107 --   09/16/22 1923 -- (!) 136 24 (!) 161/91 --   09/16/22 1915 -- (!) 121 23 -- 91 %   09/16/22 1900 -- (!) 147 27 (!) 159/117 91 %   09/16/22 1850 -- (!) 141 25 (!) 146/103 93 %   09/16/22 1836 -- (!) 138 (!) 31 (!) 158/97 (!) 89 %   09/16/22 1828 -- -- -- -- 91 %   09/16/22 1800 97.4 °F (36.3 °C) (!) 164 20 (!) 148/119 93 %       Intake/Output Summary (Last 24 hours) at 9/17/2022 1156  Last data filed at 9/17/2022 0300  Gross per 24 hour   Intake 500 ml   Output 500 ml   Net 0 ml        I had a face to face encounter, and independently examined this patient as outlined below:    PHYSICAL EXAM:  General:    No distress     HEENT: Atraumatic, anicteric sclerae, pink conjunctivae, MMM  Neck:  Supple, symmetrical  Lungs:   CTA. No Wheezing/Rhonchi. No rales. No tenderness. No Accessory muscle use. CVS:   Regular rhythm. No murmur. No JVD   GI/:   Soft. NT. ND. BS normal  Extremities: No edema. No cyanosis. No clubbing. Skin:     Not pale. Not Jaundiced. No rashes   Psych:  Good insight. Not depressed. Not anxious or agitated. Neurologic: Alert and oriented X 4. EOMs intact. No facial asymmetry. No slurred speech. Symmetrical strength, Sensation grossly intact. Labs     I reviewed today's most current labs and imaging studies.   Pertinent labs include:  Recent Labs     09/17/22  0046 09/16/22  4457 WBC 15.9* 12.1*   HGB 14.8 15.4   HCT 44.4 45.1    210     Recent Labs     09/17/22  0046 09/16/22  1820    138   K 3.8 4.0   * 109*   CO2 24 21   * 131*   BUN 15 14   CREA 1.27 1.28   CA 9.2 9.4   MG 2.4 2.5*   ALB 3.6 3.8   TBILI 1.6* 1.2*   * 249*     US ABD LTD    Result Date: 9/16/2022  Unremarkable right upper quadrant ultrasound. XR CHEST PORT    Result Date: 9/16/2022  1. Diffuse interstitial prominence which could represent chronic lung changes, pulmonary vascular congestion and/or atypical infectious process. US ABD LTD    Result Date: 9/16/2022  Unremarkable right upper quadrant ultrasound. XR CHEST PORT    Result Date: 9/16/2022  1. Diffuse interstitial prominence which could represent chronic lung changes, pulmonary vascular congestion and/or atypical infectious process. All Micro Results       Procedure Component Value Units Date/Time    COVID-19 RAPID TEST [[id number]] Collected: 09/16/22 2132    Order Status: Completed Specimen: Nasopharyngeal Updated: 09/16/22 2213     Specimen source Nasopharyngeal        COVID-19 rapid test Not detected        Comment: Rapid Abbott ID Now       Rapid NAAT:  The specimen is NEGATIVE for SARS-CoV-2, the novel coronavirus associated with COVID-19. Negative results should be treated as presumptive and, if inconsistent with clinical signs and symptoms or necessary for patient management, should be tested with an alternative molecular assay. Negative results do not preclude SARS-CoV-2 infection and should not be used as the sole basis for patient management decisions. This test has been authorized by the FDA under an Emergency Use Authorization (EUA) for use by authorized laboratories.    Fact sheet for Healthcare Providers: ConventionUpdate.co.nz  Fact sheet for Patients: ConventionUpdate.co.nz       Methodology: Isothermal Nucleic Acid Amplification Current Medications:     Current Facility-Administered Medications:     dilTIAZem (CARDIZEM) 100 mg in dextrose 5% (MBP/ADV) 100 mL infusion, 0-15 mg/hr, IntraVENous, TITRATE, Jackson Hunt MD, Last Rate: 10 mL/hr at 09/17/22 0832, 10 mg/hr at 09/17/22 5669    sodium chloride (NS) flush 5-40 mL, 5-40 mL, IntraVENous, Q8H, Jayce Dickey MD, 10 mL at 09/17/22 0521    sodium chloride (NS) flush 5-40 mL, 5-40 mL, IntraVENous, PRN, Jackson Hunt MD    acetaminophen (TYLENOL) tablet 650 mg, 650 mg, Oral, Q6H PRN **OR** acetaminophen (TYLENOL) suppository 650 mg, 650 mg, Rectal, Q6H PRN, Jackson Hutn MD    polyethylene glycol (MIRALAX) packet 17 g, 17 g, Oral, DAILY PRN, Jayce Dickey MD    ondansetron (ZOFRAN ODT) tablet 4 mg, 4 mg, Oral, Q8H PRN **OR** ondansetron (ZOFRAN) injection 4 mg, 4 mg, IntraVENous, Q6H PRN, Jayce Dickey MD    enoxaparin (LOVENOX) injection 40 mg, 40 mg, SubCUTAneous, DAILY, Jackson Dickey MD     Procedures: see electronic medical records for all procedures/Xrays and details which were not copied into this note but were reviewed prior to creation of Plan. Reviewed most current lab test results and cultures  YES  Reviewed most current radiology test results   YES  Review and summation of old records today    NO  Reviewed patient's current orders and MAR    YES  PMH/SH reviewed - no change compared to H&P  ________________________________________________________________________  Care Plan discussed with:    Comments   Patient x    Family      RN x    Care Manager     Consultant                        Multidiciplinary team rounds were held today with , nursing, pharmacist and clinical coordinator. Patient's plan of care was discussed; medications were reviewed and discharge planning was addressed.      ________________________________________________________________________  Total NON critical care TIME:  33   Minutes    Total CRITICAL CARE TIME Spent:   Minutes non procedure based      Comments   >50% of visit spent in counseling and coordination of care x     This includes time during multidisciplinary rounds if indicated above   ________________________________________________________________________  Kayla Clements MD

## 2022-09-17 NOTE — PROGRESS NOTES
2200- TRANSFER - IN REPORT:    Verbal report received from [facility] (name) on Rosalva Gupta  being received from ED (unit) for routine progression of care      Report consisted of patients Situation, Background, Assessment and   Recommendations(SBAR). Information from the following report(s) SBAR, Kardex, ED Summary, Intake/Output, MAR, Recent Results, Med Rec Status, and Cardiac Rhythm A fib  was reviewed with the receiving nurse. Opportunity for questions and clarification was provided. Assessment completed upon patients arrival to unit and care assumed. 0700- End of Shift Note    Bedside shift change report given to Minda Woods (oncoming nurse) by Lindsay Guerrero RN (offgoing nurse). Report included the following information SBAR, Kardex, ED Summary, Intake/Output, MAR, Recent Results, Med Rec Status, and Cardiac Rhythm A fib    Shift worked:  7p-7a     Shift summary and any significant changes:     Pt remains on dilt gtt     Concerns for physician to address:      Zone phone for oncoming shift:          Activity:  Activity Level: Up with Assistance  Number times ambulated in hallways past shift: 0  Number of times OOB to chair past shift: 0    Cardiac:   Cardiac Monitoring: Yes      Cardiac Rhythm: Atrial Fib    Access:  Current line(s): PIV     Genitourinary:   Urinary status: voiding    Respiratory:   O2 Device: Nasal cannula  Chronic home O2 use?: NO  Incentive spirometer at bedside: NO       GI:     Current diet:  DIET NPO  Passing flatus: YES  Tolerating current diet: YES       Pain Management:   Patient states pain is manageable on current regimen: YES    Skin:  Alban Score: 22  Interventions: turn team, speciality bed, float heels, and increase time out of bed    Patient Safety:  Fall Score:  Total Score: 0  Interventions: bed/chair alarm, assistive device (walker, cane, etc), gripper socks, and pt to call before getting OOB       Length of Stay:  Expected LOS: - - -  Actual LOS: [facility] Corina Liu RN

## 2022-09-18 ENCOUNTER — APPOINTMENT (OUTPATIENT)
Dept: NON INVASIVE DIAGNOSTICS | Age: 82
DRG: 286 | End: 2022-09-18
Attending: INTERNAL MEDICINE
Payer: MEDICARE

## 2022-09-18 LAB
ALBUMIN SERPL-MCNC: 3.3 G/DL (ref 3.5–5)
ALBUMIN/GLOB SERPL: 1 {RATIO} (ref 1.1–2.2)
ALP SERPL-CCNC: 63 U/L (ref 45–117)
ALT SERPL-CCNC: 145 U/L (ref 12–78)
ANION GAP SERPL CALC-SCNC: 7 MMOL/L (ref 5–15)
AST SERPL-CCNC: 42 U/L (ref 15–37)
ATRIAL RATE: 156 BPM
BASOPHILS # BLD: 0.1 K/UL (ref 0–0.1)
BASOPHILS NFR BLD: 1 % (ref 0–1)
BILIRUB SERPL-MCNC: 1.3 MG/DL (ref 0.2–1)
BUN SERPL-MCNC: 19 MG/DL (ref 6–20)
BUN/CREAT SERPL: 15 (ref 12–20)
CALCIUM SERPL-MCNC: 9 MG/DL (ref 8.5–10.1)
CALCULATED R AXIS, ECG10: -12 DEGREES
CALCULATED T AXIS, ECG11: -9 DEGREES
CHLORIDE SERPL-SCNC: 108 MMOL/L (ref 97–108)
CO2 SERPL-SCNC: 25 MMOL/L (ref 21–32)
CREAT SERPL-MCNC: 1.29 MG/DL (ref 0.7–1.3)
DIAGNOSIS, 93000: NORMAL
DIFFERENTIAL METHOD BLD: ABNORMAL
EOSINOPHIL # BLD: 0.2 K/UL (ref 0–0.4)
EOSINOPHIL NFR BLD: 2 % (ref 0–7)
ERYTHROCYTE [DISTWIDTH] IN BLOOD BY AUTOMATED COUNT: 15.3 % (ref 11.5–14.5)
GLOBULIN SER CALC-MCNC: 3.2 G/DL (ref 2–4)
GLUCOSE SERPL-MCNC: 105 MG/DL (ref 65–100)
HCT VFR BLD AUTO: 41.2 % (ref 36.6–50.3)
HGB BLD-MCNC: 13.6 G/DL (ref 12.1–17)
IMM GRANULOCYTES # BLD AUTO: 0.1 K/UL (ref 0–0.04)
IMM GRANULOCYTES NFR BLD AUTO: 1 % (ref 0–0.5)
LYMPHOCYTES # BLD: 2 K/UL (ref 0.8–3.5)
LYMPHOCYTES NFR BLD: 18 % (ref 12–49)
MCH RBC QN AUTO: 31.8 PG (ref 26–34)
MCHC RBC AUTO-ENTMCNC: 33 G/DL (ref 30–36.5)
MCV RBC AUTO: 96.3 FL (ref 80–99)
MONOCYTES # BLD: 1.3 K/UL (ref 0–1)
MONOCYTES NFR BLD: 11 % (ref 5–13)
NEUTS SEG # BLD: 7.9 K/UL (ref 1.8–8)
NEUTS SEG NFR BLD: 67 % (ref 32–75)
NRBC # BLD: 0 K/UL (ref 0–0.01)
NRBC BLD-RTO: 0 PER 100 WBC
PLATELET # BLD AUTO: 176 K/UL (ref 150–400)
PMV BLD AUTO: 10.7 FL (ref 8.9–12.9)
POTASSIUM SERPL-SCNC: 3.7 MMOL/L (ref 3.5–5.1)
PROT SERPL-MCNC: 6.5 G/DL (ref 6.4–8.2)
Q-T INTERVAL, ECG07: 334 MS
QRS DURATION, ECG06: 114 MS
QTC CALCULATION (BEZET), ECG08: 529 MS
RBC # BLD AUTO: 4.28 M/UL (ref 4.1–5.7)
SARS-COV-2, XPLCVT: NOT DETECTED
SODIUM SERPL-SCNC: 140 MMOL/L (ref 136–145)
SOURCE, COVRS: NORMAL
VENTRICULAR RATE, ECG03: 151 BPM
WBC # BLD AUTO: 11.6 K/UL (ref 4.1–11.1)

## 2022-09-18 PROCEDURE — 86376 MICROSOMAL ANTIBODY EACH: CPT

## 2022-09-18 PROCEDURE — 74011000250 HC RX REV CODE- 250: Performed by: INTERNAL MEDICINE

## 2022-09-18 PROCEDURE — 74011250636 HC RX REV CODE- 250/636: Performed by: GENERAL ACUTE CARE HOSPITAL

## 2022-09-18 PROCEDURE — 74011250637 HC RX REV CODE- 250/637: Performed by: STUDENT IN AN ORGANIZED HEALTH CARE EDUCATION/TRAINING PROGRAM

## 2022-09-18 PROCEDURE — 36415 COLL VENOUS BLD VENIPUNCTURE: CPT

## 2022-09-18 PROCEDURE — 85025 COMPLETE CBC W/AUTO DIFF WBC: CPT

## 2022-09-18 PROCEDURE — 80053 COMPREHEN METABOLIC PANEL: CPT

## 2022-09-18 PROCEDURE — 65270000046 HC RM TELEMETRY

## 2022-09-18 PROCEDURE — 83520 IMMUNOASSAY QUANT NOS NONAB: CPT

## 2022-09-18 RX ORDER — DILTIAZEM HYDROCHLORIDE 60 MG/1
60 TABLET, FILM COATED ORAL
Status: DISCONTINUED | OUTPATIENT
Start: 2022-09-18 | End: 2022-09-19

## 2022-09-18 RX ADMIN — SODIUM CHLORIDE, PRESERVATIVE FREE 10 ML: 5 INJECTION INTRAVENOUS at 21:24

## 2022-09-18 RX ADMIN — SODIUM CHLORIDE, PRESERVATIVE FREE 10 ML: 5 INJECTION INTRAVENOUS at 13:19

## 2022-09-18 RX ADMIN — DILTIAZEM HYDROCHLORIDE 60 MG: 60 TABLET, FILM COATED ORAL at 11:39

## 2022-09-18 RX ADMIN — SODIUM CHLORIDE, PRESERVATIVE FREE 10 ML: 5 INJECTION INTRAVENOUS at 06:18

## 2022-09-18 RX ADMIN — ENOXAPARIN SODIUM 120 MG: 150 INJECTION SUBCUTANEOUS at 02:44

## 2022-09-18 RX ADMIN — DILTIAZEM HYDROCHLORIDE 30 MG: 30 TABLET, FILM COATED ORAL at 07:58

## 2022-09-18 RX ADMIN — DILTIAZEM HYDROCHLORIDE 60 MG: 60 TABLET, FILM COATED ORAL at 21:24

## 2022-09-18 RX ADMIN — ENOXAPARIN SODIUM 120 MG: 150 INJECTION SUBCUTANEOUS at 13:14

## 2022-09-18 RX ADMIN — DILTIAZEM HYDROCHLORIDE 60 MG: 60 TABLET, FILM COATED ORAL at 16:49

## 2022-09-18 NOTE — PROGRESS NOTES
TRANSFER - IN REPORT:    Verbal report received from Joshua(name) on Miriam Crosser  being received from PCU(unit) for ordered procedure      Report consisted of patients Situation, Background, Assessment and   Recommendations(SBAR). Information from the following report(s) SBAR, Kardex, Procedure Summary, MAR, Recent Results, and Cardiac Rhythm Afib  was reviewed with the receiving nurse. Opportunity for questions and clarification was provided. Assessment completed upon patients arrival to unit and care assumed.

## 2022-09-18 NOTE — PROGRESS NOTES
1900- Bedside shift change report given to Neal Young RN (oncoming nurse) by Glen Medina RN (offgoing nurse). Report included the following information SBAR, Kardex, ED Summary, Intake/Output, MAR, Recent Results, Med Rec Status, and Cardiac Rhythm A fib .     2300- TRANSFER - OUT REPORT:    Verbal report given to Catie Mccollum RN(name) on Umm Cruz  being transferred to [facility] for routine progression of care       Report consisted of patients Situation, Background, Assessment and   Recommendations(SBAR). Information from the following report(s) SBAR, Kardex, ED Summary, Intake/Output, MAR, Recent Results, Med Rec Status, and Cardiac Rhythm A fib  was reviewed with the receiving nurse. Lines:   Peripheral IV 09/16/22 Left Forearm (Active)   Site Assessment Clean, dry, & intact 09/17/22 2321   Phlebitis Assessment 0 09/17/22 2321   Infiltration Assessment 0 09/17/22 2321   Dressing Status Clean, dry, & intact 09/17/22 2321   Dressing Type Transparent 09/17/22 2321   Hub Color/Line Status Pink;Capped;Flushed 09/17/22 2321   Action Taken Open ports on tubing capped 09/17/22 1923   Alcohol Cap Used Yes 09/17/22 1923       Peripheral IV 09/16/22 Distal;Posterior;Right Forearm (Active)   Site Assessment Clean, dry, & intact 09/17/22 2321   Phlebitis Assessment 0 09/17/22 2321   Infiltration Assessment 0 09/17/22 2321   Dressing Status Clean, dry, & intact 09/17/22 2321   Dressing Type Transparent 09/17/22 2321   Hub Color/Line Status Green;Capped;Flushed 09/17/22 2321   Action Taken Open ports on tubing capped 09/17/22 1923   Alcohol Cap Used Yes 09/17/22 1923        Opportunity for questions and clarification was provided.       Patient transported with:   Monitor  Registered Nurse

## 2022-09-18 NOTE — PROGRESS NOTES
Hospitalist Progress Note    NAME: Mikael Cabezas   :  1940   MRN:  [id number]       Assessment / Plan:    Atrial fibrillation with rapid ventricular rate new onset  -Initiate work-up for new onset A. Fib  -CHADSVASC score 2  Status post Cardizem drip, switch to p.o. Cardizem, heart rate control  Continue with full dose lovenox   Cardiology consult appreciated, plan for RONALD and cardioversion tomorrow  -Magnesium level is 2.5  BS negative  -TSH level is elevated at 9.6, T4 wnl. Re check TFT in 4 weeks    Elevated TSH concerning for hypothyroidism, ? Sick euthyroid syndrome  --TSH level is elevated at 9.6, T4 wnl. Re check TFT in 4 weeks  Will check free T3, check Thyroid antibody panel  Patient is not on any treatment for hypothyroidism     Abnormal chest x-ray  Acute hypoxic resp failure   Leukocytosis, worsening   Elevated BNP, rule out congestive heart failure  -proBNP is 3000  -Follow results of echocardiogram  -rapid covid neg  -Chest x-ray shows diffuse interstitial prominence which could represent chronic lung changes versus pulmonary congestion versus atypical infectious process  -Procalcitonin 0.08, will hold off antibiotics  -Check COVID PCR  -D Dimer mildly elevated  -If above w/up is neg then order autoimmune w/up   CT of the chest showed resolved pulmonary edema. There are small to moderate-sized bilateral pleural effusions with associated  compressive atelectasis of the adjacent posterior lungs. Patient still on 2 L of oxygen via nasal cannula saturating at 99%, we can wean down and off oxygen  wbC trending down  Elevated LFTs  US and hepatitis panel neg  -If above w/up is neg then order autoimmune w/up   ?  Viral infection   LFTs trending down  Ultrasound is within normal limits  Code Status: Full code  Surrogate Decision Maker: Sister  DVT Prophylaxis: Lovenox   Baseline: From home, independent of ADLs  Recommended Disposition: Home w/Family  Anticipated Discharge Date:  48 hours        Subjective: Follow-up A. fib with RVR, acute respiratory failure hypoxia  No acute issues, patient feeling better  Review of Systems:  Symptom Y/N Comments  Symptom Y/N Comments   Fever/Chills    Chest Pain n    Poor Appetite    Edema     Cough n   Abdominal Pain n    Sputum    Joint Pain     SOB/REED n   Pruritis/Rash     Nausea/vomit    Tolerating PT/OT     Diarrhea    Tolerating Diet y    Constipation    Other       PO intake: No data found. Wt Readings from Last 10 Encounters:   09/16/22 111.1 kg (245 lb)       Objective:     VITALS:   Last 24hrs VS reviewed since prior progress note. Most recent are:  Patient Vitals for the past 24 hrs:   Temp Pulse Resp BP SpO2   09/18/22 0244 97.9 °F (36.6 °C) 90 24 121/72 98 %   09/17/22 2303 97.8 °F (36.6 °C) (!) 102 28 139/79 97 %   09/17/22 2245 97.9 °F (36.6 °C) 94 17 132/79 94 %   09/17/22 1923 97.8 °F (36.6 °C) (!) 112 19 136/88 97 %   09/17/22 1406 98.2 °F (36.8 °C) 98 18 116/78 95 %         Intake/Output Summary (Last 24 hours) at 9/18/2022 0739  Last data filed at 9/17/2022 1923  Gross per 24 hour   Intake --   Output 400 ml   Net -400 ml          I had a face to face encounter, and independently examined this patient as outlined below:    PHYSICAL EXAM:  General:    No distress     HEENT: Atraumatic, anicteric sclerae, pink conjunctivae, MMM  Neck:  Supple, symmetrical  Lungs:   CTA. No Wheezing/Rhonchi. No rales. No tenderness. No Accessory muscle use. CVS:   Regular rhythm. No murmur. No JVD   GI/:   Soft. NT. ND. BS normal  Extremities: No edema. No cyanosis. No clubbing. Skin:     Not pale. Not Jaundiced. No rashes   Psych:  Good insight. Not depressed. Not anxious or agitated. Neurologic: Alert and oriented X 4. EOMs intact. No facial asymmetry. No slurred speech. Symmetrical strength, Sensation grossly intact.        Labs     I reviewed today's most current labs and imaging studies. Pertinent labs include:  Recent Labs     09/18/22  0247 09/17/22  0046 09/16/22  1820   WBC 11.6* 15.9* 12.1*   HGB 13.6 14.8 15.4   HCT 41.2 44.4 45.1    202 210       Recent Labs     09/18/22  0247 09/17/22  0046 09/16/22  1820    141 138   K 3.7 3.8 4.0    109* 109*   CO2 25 24 21   * 129* 131*   BUN 19 15 14   CREA 1.29 1.27 1.28   CA 9.0 9.2 9.4   MG  --  2.4 2.5*   ALB 3.3* 3.6 3.8   TBILI 1.3* 1.6* 1.2*   * 209* 249*       CT CHEST WO CONT    Result Date: 9/17/2022  Resolved pulmonary edema. Pleural effusions. US ABD LTD    Result Date: 9/16/2022  Unremarkable right upper quadrant ultrasound. XR CHEST PORT    Result Date: 9/16/2022  1. Diffuse interstitial prominence which could represent chronic lung changes, pulmonary vascular congestion and/or atypical infectious process. CT CHEST WO CONT    Result Date: 9/17/2022  Resolved pulmonary edema. Pleural effusions. All Micro Results       Procedure Component Value Units Date/Time    COVID-19 RAPID TEST [[id number]] Collected: 09/16/22 2132    Order Status: Completed Specimen: Nasopharyngeal Updated: 09/16/22 2213     Specimen source Nasopharyngeal        COVID-19 rapid test Not detected        Comment: Rapid Abbott ID Now       Rapid NAAT:  The specimen is NEGATIVE for SARS-CoV-2, the novel coronavirus associated with COVID-19. Negative results should be treated as presumptive and, if inconsistent with clinical signs and symptoms or necessary for patient management, should be tested with an alternative molecular assay. Negative results do not preclude SARS-CoV-2 infection and should not be used as the sole basis for patient management decisions. This test has been authorized by the FDA under an Emergency Use Authorization (EUA) for use by authorized laboratories.    Fact sheet for Healthcare Providers: ConventionUpdate.co.nz  Fact sheet for Patients: [facility].       Methodology: Isothermal Nucleic Acid Amplification                   Current Medications:     Current Facility-Administered Medications:     enoxaparin (LOVENOX) injection 120 mg, 1 mg/kg, SubCUTAneous, Q12H, Maria Dolores Nicole MD, 120 mg at 09/18/22 0244    dilTIAZem IR (CARDIZEM) tablet 30 mg, 30 mg, Oral, AC&HS, Sterling Sterling MD, 30 mg at 09/17/22 2126    dilTIAZem (CARDIZEM) 100 mg in dextrose 5% (MBP/ADV) 100 mL infusion, 0-15 mg/hr, IntraVENous, TITRATE, Donaldo Crouch MD, Stopped at 09/17/22 1200    sodium chloride (NS) flush 5-40 mL, 5-40 mL, IntraVENous, Q8H, Jayce Dickey MD, 10 mL at 09/18/22 0618    sodium chloride (NS) flush 5-40 mL, 5-40 mL, IntraVENous, PRN, Donaldo Crouch MD    acetaminophen (TYLENOL) tablet 650 mg, 650 mg, Oral, Q6H PRN **OR** acetaminophen (TYLENOL) suppository 650 mg, 650 mg, Rectal, Q6H PRN, Donaldo Crouch MD    polyethylene glycol (MIRALAX) packet 17 g, 17 g, Oral, DAILY PRN, Jayce Dickey MD    ondansetron (ZOFRAN ODT) tablet 4 mg, 4 mg, Oral, Q8H PRN **OR** ondansetron (ZOFRAN) injection 4 mg, 4 mg, IntraVENous, Q6H PRN, Donaldo Crouch MD     Procedures: see electronic medical records for all procedures/Xrays and details which were not copied into this note but were reviewed prior to creation of Plan. Reviewed most current lab test results and cultures  YES  Reviewed most current radiology test results   YES  Review and summation of old records today    NO  Reviewed patient's current orders and MAR    YES  PMH/SH reviewed - no change compared to H&P  ________________________________________________________________________  Care Plan discussed with:    Comments   Patient x    Family      RN x    Care Manager     Consultant                        Multidiciplinary team rounds were held today with , nursing, pharmacist and clinical coordinator.   Patient's plan of care was discussed; medications were reviewed and discharge planning was addressed.      ________________________________________________________________________  Total NON critical care TIME:  35 Minutes    Total CRITICAL CARE TIME Spent:   Minutes non procedure based      Comments   >50% of visit spent in counseling and coordination of care x     This includes time during multidisciplinary rounds if indicated above   ________________________________________________________________________  Emmanuel Fairbanks MD

## 2022-09-18 NOTE — PROGRESS NOTES
Problem: Falls - Risk of  Goal: *Absence of Falls  Description: Document [place] Fall Risk and appropriate interventions in the flowsheet.   Outcome: Progressing Towards Goal  Note: Fall Risk Interventions:            Medication Interventions: Evaluate medications/consider consulting pharmacy                   Problem: Patient Education: Go to Patient Education Activity  Goal: Patient/Family Education  Outcome: Progressing Towards Goal

## 2022-09-18 NOTE — PROGRESS NOTES
Transition of Care Plan:    RUR: 8%  Disposition: Home with follow-up appts  Follow up appointments: To be scheduled prior to d/c   DME needed: The patient was on O2 when CM met with the patient, he does not have home O2 at home. He may need home O2 arranged if O2 cannot be weaned  Transportation at Discharge: The patient's friend will be transporting him home   [place] or means to access home: Yes      IM Medicare Letter: To be given prior to d/c   Is patient a  and connected with the [facility]? N/A               If yes, was [employer] transfer form completed and [facility] notified? Caregiver Contact: Sister Montiel, Phone: [phone]  Discharge Caregiver contacted prior to discharge? CM will contact his sister if he requests this   Care Conference needed?: No    Reason for Admission:  Afib with RVR                RUR Score:  8%                   Plan for utilizing home health:  Unknown at this time, currently there are no PT/OT consults. At baseline, the patient is completely independent in his ADLs and IADLs        PCP: First and Last name:  None, the patient does not have a PCP   Name of Practice:    Are you a current patient: Yes/No:    Approximate date of last visit:    Can you participate in a virtual visit with your PCP:                     Current Advanced Directive/Advance Care Plan: Full Code    Healthcare Decision Maker: Sister Montiel, Phone: [phone]                Transition of Care Plan:     CM met with the patient at bedside to discuss dispo plan. The patient lives in a 1 level home with 6 steps to enter. The patient reported that he does have a roommate but the roommate is only at the house once every 2 weeks. He has never been  and he has no children. He has 1 sister that lives in [place]. He has a close friend, Dada Cooper (phone: [phone]), and his wife, Cassie Berrios (phone: [phone]), that live locally that are very supportive to him.  At baseline, the patient is completely independent in his ADLs and IADLs. He uses no assistive device when ambulating. He is able to drive and his friend, Chavez Padron, will be transporting him home. He is retired and he typically uses [facility] for his medications. He has never used [facility] services and he has never been to a SNF/IPR facility in the past. CM will continue to follow the patient for dispo needs. Care Management Interventions  PCP Verified by CM: Yes  Mode of Transport at Discharge:  Other (see comment) (The patient has a friend that will be transporting him home )  Transition of Care Consult (CM Consult): Discharge Planning  MyChart Signup: No  Discharge Durable Medical Equipment: No  Physical Therapy Consult: No  Occupational Therapy Consult: No  Speech Therapy Consult: No  Support Systems: Friend/Neighbor, Other Family Member(s)  Confirm Follow Up Transport: Self  The Patient and/or Patient Representative was Provided with a Choice of Provider and Agrees with the Discharge Plan?: Yes  Name of the Patient Representative Who was Provided with a Choice of Provider and Agrees with the Discharge Plan: [address] Provided?: No  Discharge Location  Patient Expects to be Discharged to[de-identified] Home     [place], [place]

## 2022-09-18 NOTE — PROGRESS NOTES
Problem: Falls - Risk of  Goal: *Absence of Falls  Description: Document [place] Fall Risk and appropriate interventions in the flowsheet.   Outcome: Progressing Towards Goal  Note: Fall Risk Interventions:            Medication Interventions: Evaluate medications/consider consulting pharmacy, Patient to call before getting OOB, Teach patient to arise slowly                   Problem: Patient Education: Go to Patient Education Activity  Goal: Patient/Family Education  Outcome: Progressing Towards Goal

## 2022-09-18 NOTE — PROGRESS NOTES
Progress Note      9/18/2022 9:57 AM  NAME: Mikael Cabezas   MRN:  [id number]   Admit Diagnosis: Atrial fibrillation with rapid ventricular response (HCC) [I48.91]          Assessment:     Problem list:   Atrial fibrillation with rapid ventricular rate, unknown duration  Hypothyroidism, elevated TSH 9.6, T4 within normal limit  Elevated BNP, dyspnea at night times, mild acute on chronic congestive heart failure with rapid ventricular rate  Leukocytosis         Recommendations:     Weaned off of diltiazem drip, increase dilt to 60 q6   Continue anticoagulation, discussed risk and benefit of anticoagulation  We discussed about risk and benefit of rate versus rhythm control, he agreed to have rhythm control attempt, will plan for RONALD and cardioversion tomorrow        Thank you for this consult and allowing me to take part in this patients care. Please call with questions. Subjective:     HPI:  No CP or SOB  HR increased when ambulated     Objective:      Physical Exam:    Last 24hrs VS reviewed since prior progress note.  Most recent are:    Visit Vitals  BP (!) 142/81 (BP 1 Location: Right upper arm, BP Patient Position: At rest)   Pulse (!) 109   Temp 98.5 °F (36.9 °C)   Resp 22   Ht 6' (1.829 m)   Wt 111.1 kg (245 lb)   SpO2 98%   BMI 33.23 kg/m²       Intake/Output Summary (Last 24 hours) at 9/18/2022 0957  Last data filed at 9/17/2022 1923  Gross per 24 hour   Intake --   Output 400 ml   Net -400 ml         General: Alert and oriented x3, no acute distress   Neck: Supple   Respiratory: No respiratory distress, clear lung sound   Cardiovascular: Irregular rate rhythm, S1S2, no murmur   Abdomen: soft, non tender, non distended   Neuro: moves all extremities, oriented x3   Skin: warm and dry   Extremity: no edema, warm to touch      Data Review    Telemetry: aFIB         Lab Data Personally Reviewed:    Recent Labs     09/18/22  0247 09/17/22  0046   WBC 11.6* 15.9*   HGB 13.6 14.8   HCT 41.2 44.4    202     No results for input(s): INR, PTP, APTT, INREXT in the last 72 hours. Recent Labs     09/18/22 0247 09/17/22  0046 09/16/22  1820    141 138   K 3.7 3.8 4.0    109* 109*   CO2 25 24 21   BUN 19 15 14   CREA 1.29 1.27 1.28   * 129* 131*   CA 9.0 9.2 9.4   MG  --  2.4 2.5*     No results for input(s): CPK, CKNDX, TROIQ in the last 72 hours. No lab exists for component: CPKMB  No results found for: CHOL, CHOLX, CHLST, CHOLV, HDL, HDLP, LDL, LDLC, DLDLP, Craige Sonja, CHHD, CHHDX    Recent Labs     09/18/22 0247 09/17/22 0046 09/16/22  1820   AP 63 72 82   TP 6.5 7.0 7.4   ALB 3.3* 3.6 3.8   GLOB 3.2 3.4 3.6     No results for input(s): PH, PCO2, PO2 in the last 72 hours.     Medications Personally Reviewed:    Current Facility-Administered Medications   Medication Dose Route Frequency    enoxaparin (LOVENOX) injection 120 mg  1 mg/kg SubCUTAneous Q12H    dilTIAZem IR (CARDIZEM) tablet 30 mg  30 mg Oral AC&HS    dilTIAZem (CARDIZEM) 100 mg in dextrose 5% (MBP/ADV) 100 mL infusion  0-15 mg/hr IntraVENous TITRATE    sodium chloride (NS) flush 5-40 mL  5-40 mL IntraVENous Q8H    sodium chloride (NS) flush 5-40 mL  5-40 mL IntraVENous PRN    acetaminophen (TYLENOL) tablet 650 mg  650 mg Oral Q6H PRN    Or    acetaminophen (TYLENOL) suppository 650 mg  650 mg Rectal Q6H PRN    polyethylene glycol (MIRALAX) packet 17 g  17 g Oral DAILY PRN    ondansetron (ZOFRAN ODT) tablet 4 mg  4 mg Oral Q8H PRN    Or    ondansetron (ZOFRAN) injection 4 mg  4 mg IntraVENous Q6H PRN              Caleb Bui MD

## 2022-09-19 ENCOUNTER — APPOINTMENT (OUTPATIENT)
Dept: NON INVASIVE DIAGNOSTICS | Age: 82
DRG: 286 | End: 2022-09-19
Attending: STUDENT IN AN ORGANIZED HEALTH CARE EDUCATION/TRAINING PROGRAM
Payer: MEDICARE

## 2022-09-19 LAB
ANION GAP SERPL CALC-SCNC: 7 MMOL/L (ref 5–15)
BASOPHILS # BLD: 0.1 K/UL (ref 0–0.1)
BASOPHILS NFR BLD: 1 % (ref 0–1)
BUN SERPL-MCNC: 20 MG/DL (ref 6–20)
BUN/CREAT SERPL: 16 (ref 12–20)
CALCIUM SERPL-MCNC: 8.8 MG/DL (ref 8.5–10.1)
CHLORIDE SERPL-SCNC: 109 MMOL/L (ref 97–108)
CO2 SERPL-SCNC: 24 MMOL/L (ref 21–32)
CREAT SERPL-MCNC: 1.26 MG/DL (ref 0.7–1.3)
DIFFERENTIAL METHOD BLD: ABNORMAL
ECHO MV REGURGITANT ALIASING (NYQUIST) VELOCITY: 33 CM/S
ECHO MV REGURGITANT VELOCITY PISA: 5 M/S
ECHO MV REGURGITANT VTIA: 119.1 CM
EOSINOPHIL # BLD: 0.3 K/UL (ref 0–0.4)
EOSINOPHIL NFR BLD: 3 % (ref 0–7)
ERYTHROCYTE [DISTWIDTH] IN BLOOD BY AUTOMATED COUNT: 15.3 % (ref 11.5–14.5)
GLUCOSE SERPL-MCNC: 106 MG/DL (ref 65–100)
HCT VFR BLD AUTO: 39.7 % (ref 36.6–50.3)
HGB BLD-MCNC: 13.2 G/DL (ref 12.1–17)
IMM GRANULOCYTES # BLD AUTO: 0.1 K/UL (ref 0–0.04)
IMM GRANULOCYTES NFR BLD AUTO: 1 % (ref 0–0.5)
LYMPHOCYTES # BLD: 2.1 K/UL (ref 0.8–3.5)
LYMPHOCYTES NFR BLD: 20 % (ref 12–49)
MCH RBC QN AUTO: 31.8 PG (ref 26–34)
MCHC RBC AUTO-ENTMCNC: 33.2 G/DL (ref 30–36.5)
MCV RBC AUTO: 95.7 FL (ref 80–99)
MONOCYTES # BLD: 1.1 K/UL (ref 0–1)
MONOCYTES NFR BLD: 11 % (ref 5–13)
NEUTS SEG # BLD: 6.9 K/UL (ref 1.8–8)
NEUTS SEG NFR BLD: 64 % (ref 32–75)
NRBC # BLD: 0 K/UL (ref 0–0.01)
NRBC BLD-RTO: 0 PER 100 WBC
PLATELET # BLD AUTO: 181 K/UL (ref 150–400)
PMV BLD AUTO: 10.7 FL (ref 8.9–12.9)
POTASSIUM SERPL-SCNC: 3.8 MMOL/L (ref 3.5–5.1)
RBC # BLD AUTO: 4.15 M/UL (ref 4.1–5.7)
SODIUM SERPL-SCNC: 140 MMOL/L (ref 136–145)
T3FREE SERPL-MCNC: 2.7 PG/ML (ref 2.2–4)
WBC # BLD AUTO: 10.5 K/UL (ref 4.1–11.1)

## 2022-09-19 PROCEDURE — 36415 COLL VENOUS BLD VENIPUNCTURE: CPT

## 2022-09-19 PROCEDURE — 85025 COMPLETE CBC W/AUTO DIFF WBC: CPT

## 2022-09-19 PROCEDURE — 74011000250 HC RX REV CODE- 250: Performed by: INTERNAL MEDICINE

## 2022-09-19 PROCEDURE — 65270000046 HC RM TELEMETRY

## 2022-09-19 PROCEDURE — 74011250636 HC RX REV CODE- 250/636: Performed by: GENERAL ACUTE CARE HOSPITAL

## 2022-09-19 PROCEDURE — 92960 CARDIOVERSION ELECTRIC EXT: CPT

## 2022-09-19 PROCEDURE — 96374 THER/PROPH/DIAG INJ IV PUSH: CPT

## 2022-09-19 PROCEDURE — 77030018729 HC ELECTRD DEFIB PAD CARD -B

## 2022-09-19 PROCEDURE — 74011000250 HC RX REV CODE- 250: Performed by: STUDENT IN AN ORGANIZED HEALTH CARE EDUCATION/TRAINING PROGRAM

## 2022-09-19 PROCEDURE — 99152 MOD SED SAME PHYS/QHP 5/>YRS: CPT

## 2022-09-19 PROCEDURE — 84481 FREE ASSAY (FT-3): CPT

## 2022-09-19 PROCEDURE — 93005 ELECTROCARDIOGRAM TRACING: CPT

## 2022-09-19 PROCEDURE — 74011250637 HC RX REV CODE- 250/637: Performed by: STUDENT IN AN ORGANIZED HEALTH CARE EDUCATION/TRAINING PROGRAM

## 2022-09-19 PROCEDURE — 80048 BASIC METABOLIC PNL TOTAL CA: CPT

## 2022-09-19 PROCEDURE — 99153 MOD SED SAME PHYS/QHP EA: CPT

## 2022-09-19 PROCEDURE — 77010033678 HC OXYGEN DAILY

## 2022-09-19 PROCEDURE — 74011250636 HC RX REV CODE- 250/636: Performed by: STUDENT IN AN ORGANIZED HEALTH CARE EDUCATION/TRAINING PROGRAM

## 2022-09-19 RX ORDER — METOPROLOL TARTRATE 25 MG/1
25 TABLET, FILM COATED ORAL EVERY 12 HOURS
Status: DISCONTINUED | OUTPATIENT
Start: 2022-09-19 | End: 2022-09-21 | Stop reason: HOSPADM

## 2022-09-19 RX ORDER — FENTANYL CITRATE 50 UG/ML
12.5-5 INJECTION, SOLUTION INTRAMUSCULAR; INTRAVENOUS
Status: DISCONTINUED | OUTPATIENT
Start: 2022-09-19 | End: 2022-09-19

## 2022-09-19 RX ORDER — SPIRONOLACTONE 25 MG/1
25 TABLET ORAL DAILY
Status: DISCONTINUED | OUTPATIENT
Start: 2022-09-20 | End: 2022-09-21 | Stop reason: HOSPADM

## 2022-09-19 RX ORDER — MIDAZOLAM HYDROCHLORIDE 1 MG/ML
.5-2 INJECTION, SOLUTION INTRAMUSCULAR; INTRAVENOUS
Status: DISCONTINUED | OUTPATIENT
Start: 2022-09-19 | End: 2022-09-19

## 2022-09-19 RX ORDER — LIDOCAINE HYDROCHLORIDE 20 MG/ML
15 SOLUTION OROPHARYNGEAL AS NEEDED
Status: DISCONTINUED | OUTPATIENT
Start: 2022-09-19 | End: 2022-09-19

## 2022-09-19 RX ADMIN — DILTIAZEM HYDROCHLORIDE 60 MG: 60 TABLET, FILM COATED ORAL at 09:11

## 2022-09-19 RX ADMIN — METOPROLOL TARTRATE 25 MG: 25 TABLET, FILM COATED ORAL at 21:50

## 2022-09-19 RX ADMIN — FENTANYL CITRATE 25 MCG: 50 INJECTION, SOLUTION INTRAMUSCULAR; INTRAVENOUS at 15:50

## 2022-09-19 RX ADMIN — BENZOCAINE, BUTAMBEN, AND TETRACAINE HYDROCHLORIDE 1 SPRAY: .028; .004; .004 AEROSOL, SPRAY TOPICAL at 15:36

## 2022-09-19 RX ADMIN — MIDAZOLAM 2 MG: 1 INJECTION INTRAMUSCULAR; INTRAVENOUS at 15:50

## 2022-09-19 RX ADMIN — DILTIAZEM HYDROCHLORIDE 60 MG: 60 TABLET, FILM COATED ORAL at 12:34

## 2022-09-19 RX ADMIN — MIDAZOLAM 1 MG: 1 INJECTION INTRAMUSCULAR; INTRAVENOUS at 15:55

## 2022-09-19 RX ADMIN — MIDAZOLAM 2 MG: 1 INJECTION INTRAMUSCULAR; INTRAVENOUS at 15:45

## 2022-09-19 RX ADMIN — ENOXAPARIN SODIUM 120 MG: 150 INJECTION SUBCUTANEOUS at 12:34

## 2022-09-19 RX ADMIN — FENTANYL CITRATE 50 MCG: 50 INJECTION, SOLUTION INTRAMUSCULAR; INTRAVENOUS at 15:45

## 2022-09-19 RX ADMIN — LIDOCAINE HYDROCHLORIDE 15 ML: 20 SOLUTION ORAL; TOPICAL at 15:36

## 2022-09-19 RX ADMIN — ENOXAPARIN SODIUM 120 MG: 150 INJECTION SUBCUTANEOUS at 23:51

## 2022-09-19 RX ADMIN — SODIUM CHLORIDE, PRESERVATIVE FREE 10 ML: 5 INJECTION INTRAVENOUS at 05:38

## 2022-09-19 RX ADMIN — SODIUM CHLORIDE, PRESERVATIVE FREE 10 ML: 5 INJECTION INTRAVENOUS at 13:54

## 2022-09-19 RX ADMIN — ENOXAPARIN SODIUM 120 MG: 150 INJECTION SUBCUTANEOUS at 00:25

## 2022-09-19 RX ADMIN — SODIUM CHLORIDE, PRESERVATIVE FREE 10 ML: 5 INJECTION INTRAVENOUS at 21:50

## 2022-09-19 NOTE — PROGRESS NOTES
0720 Bedside and Verbal shift change report given to Юлия Sheridan, RN and Maribell Bueno, MARCELO (oncoming nurse) by Mayela Flaherty RN (offgoing nurse). Report included the following information SBAR, Kardex, ED Summary, MAR, Recent Results, and Cardiac Rhythm Atrial Fib .     1505 Patient off the unit for RONALD with possible cardioversion. [id number] Patient returned from cath lab. Patient NSR on monitor. 1830 Patient tolerating fluids well post RONALD.  Currently sitting up and eating dinner

## 2022-09-19 NOTE — PROGRESS NOTES
Problem: Falls - Risk of  Goal: *Absence of Falls  Description: Document [place] Fall Risk and appropriate interventions in the flowsheet.   Outcome: Progressing Towards Goal  Note: Fall Risk Interventions:            Medication Interventions: Bed/chair exit alarm, Patient to call before getting OOB, Teach patient to arise slowly         History of Falls Interventions: Door open when patient unattended         Problem: Patient Education: Go to Patient Education Activity  Goal: Patient/Family Education  Outcome: Progressing Towards Goal

## 2022-09-19 NOTE — PROGRESS NOTES
Hospitalist Progress Note    NAME: Anthony Paulino   :  1940   MRN:  [id number]       Assessment / Plan:  Atrial fibrillation with rapid ventricular rate new onset    -CHADSVASC score 2  Status post Cardizem drip, now on cardizem oral  Continue with full dose lovenox, switch to NOAC prior tod discharge  Cardiology consult appreciated, plan for RONALD and cardioversion tpdau  TSH level is elevated at 9.6, T4 wnl. Re check TFT in 4 weeks     Elevated TSH  --TSH level is elevated at 9.6, T4 wnl. Re check TFT in 4 weeks  Will check free T3, check Thyroid antibody panel  Patient is not on any treatment for hypothyroidism     Acute hypoxic resp failure   Leukocytosis, worsening   Elevated BNP, rule out congestive heart failure  proBNP is 3000  Follow results of echocardiogram  Rapid covid neg  Chest x-ray shows diffuse interstitial prominence which could represent chronic lung changes versus pulmonary congestion versus atypical infectious process  Procalcitonin 0.08, will hold off antibiotics  Covid PCR negative  Dimer mildly elevated  CT of the chest showed resolved pulmonary edema. There are small to moderate-sized bilateral pleural effusions with associated  compressive atelectasis of the adjacent posterior lungs. Elevated LFTs  US and hepatitis panel neg  LFTs trending down  Ultrasound is within normal limits    Code Status: Full code  Surrogate Decision Maker: Sister  DVT Prophylaxis: Lovenox   Baseline: From home, independent of ADLs  Recommended Disposition: Home w/Family  Anticipated Discharge Date:    Barriers: RONALD and Cardioversion today     Subjective:     Chief Complaint / Reason for Physician Visit  Follow up for Afib  He remains in Afib today.  Denies any shortness of breath, chest pain  Eager to go for RONALD/ Cardioversion    Review of Systems:  Symptom Y/N Comments  Symptom Y/N Comments   Fever/Chills n   Chest Pain n    Poor Appetite n   Edema n    Cough n   Abdominal Pain     Sputum Joint Pain     SOB/REED    Pruritis/Rash     Nausea/vomit    Tolerating PT/OT     Diarrhea    Tolerating Diet     Constipation    Other       Could NOT obtain due to:      Objective:     VITALS:   Last 24hrs VS reviewed since prior progress note. Most recent are:  Patient Vitals for the past 24 hrs:   Temp Pulse Resp BP SpO2   09/19/22 1605 -- -- -- 106/62 --   09/19/22 1600 -- 78 15 -- 90 %   09/19/22 1555 -- 86 27 120/81 91 %   09/19/22 1550 -- 89 17 116/69 90 %   09/19/22 1545 -- 92 20 130/70 95 %   09/19/22 1540 -- 96 16 -- 95 %   09/19/22 1535 -- 92 25 135/86 93 %   09/19/22 1530 -- 80 18 (!) 145/84 91 %   09/19/22 1520 -- 92 20 (!) 151/104 94 %   09/19/22 1234 -- 91 -- 127/78 --   09/19/22 1054 98.2 °F (36.8 °C) 73 14 126/69 97 %   09/19/22 0911 -- (!) 108 -- 134/83 --   09/19/22 0724 98.8 °F (37.1 °C) (!) 102 18 133/72 96 %   09/19/22 0450 98.8 °F (37.1 °C) 67 18 121/81 95 %   09/19/22 0014 98.1 °F (36.7 °C) 97 18 117/74 98 %   09/18/22 2124 -- 97 -- 124/65 --   09/18/22 2003 98.8 °F (37.1 °C) 96 18 117/76 97 %       Intake/Output Summary (Last 24 hours) at 9/19/2022 1611  Last data filed at 9/19/2022 1115  Gross per 24 hour   Intake 0 ml   Output 450 ml   Net -450 ml        I had a face to face encounter and independently examined this patient on 9/19/2022, as outlined below:  PHYSICAL EXAM:  General: Awake, No acute distress  EENT:  EOMI. Anicteric sclerae. MMM  Resp:  CTA bilaterally, no wheezing or rales. No accessory muscle use  CV:  Regular  rhythm,  No edema  GI:  Soft, Non distended, Non tender. +Bowel sounds  Neurologic:  Alert and oriented X 3, normal speech,   Psych:   Not anxious nor agitated  Skin:  No rashes.   No jaundice    Reviewed most current lab test results and cultures  YES  Reviewed most current radiology test results   YES  Review and summation of old records today    NO  Reviewed patient's current orders and MAR    YES  PMH/SH reviewed - no change compared to H&P  ________________________________________________________________________  Care Plan discussed with:    Comments   Patient y    Family      RN y    Care Manager     Consultant                       y Multidiciplinary team rounds were held today with , nursing, pharmacist and clinical coordinator. Patient's plan of care was discussed; medications were reviewed and discharge planning was addressed. ________________________________________________________________________  Total NON critical care TIME:  37   Minutes    Total CRITICAL CARE TIME Spent:   Minutes non procedure based      Comments   >50% of visit spent in counseling and coordination of care     ________________________________________________________________________  Jasonene MD Michael     Procedures: see electronic medical records for all procedures/Xrays and details which were not copied into this note but were reviewed prior to creation of Plan. LABS:  I reviewed today's most current labs and imaging studies.   Pertinent labs include:  Recent Labs     09/19/22 0027 09/18/22 0247 09/17/22  0046   WBC 10.5 11.6* 15.9*   HGB 13.2 13.6 14.8   HCT 39.7 41.2 44.4    176 202     Recent Labs     09/19/22 0027 09/18/22 0247 09/17/22  0046 09/16/22  1820    140 141 138   K 3.8 3.7 3.8 4.0   * 108 109* 109*   CO2 24 25 24 21   * 105* 129* 131*   BUN 20 19 15 14   CREA 1.26 1.29 1.27 1.28   CA 8.8 9.0 9.2 9.4   MG  --   --  2.4 2.5*   ALB  --  3.3* 3.6 3.8   TBILI  --  1.3* 1.6* 1.2*   ALT  --  145* 209* 249*       Signed: Clark Rodriguez MD

## 2022-09-19 NOTE — PROGRESS NOTES
Patient arrived to Non-Invasive Cardiology Lab for In Patient RONALD/DCC Procedure. Staff introduced to patient. Patient identifiers verified with Name and Date of Birth. Procedure verified with patient. Consent forms reviewed and signed by patient or authorized representative and verified. Allergies verified. Patient informed of procedure and plan of care. Questions answered with review. Patient on cardiac monitor, non-invasive blood pressure, SPO2 monitor. On RA. Patient is A&Ox3. Patient reports no complaints. Patient on stretcher, in low position, with side rails up. Patient instructed to call for assistance as needed. Family in waiting room.

## 2022-09-19 NOTE — PROGRESS NOTES
Pt sedated with 5mg Versed and 75mcg Fentanyl for RONALD (monitored sedation from 1544 to 1608)    Pt sedated with an additional 0mg Versed and 0mcg Fentanyl, given 1 synchronized shock(s) at 300 Joules, atrial fib converted to sinus rhythm.(monitored sedation from 1608 to 1614)

## 2022-09-19 NOTE — PROGRESS NOTES
TRANSFER - OUT REPORT:    Verbal report given to [address], RN and Angela Price RN(name) on Jillian Potter being transferred to IVCU(unit) for routine progression of care       Report consisted of patient's Situation, Background, Assessment and   Recommendations(SBAR). Information from the following report(s) SBAR, Procedure Summary and MAR was reviewed with the receiving nurse. Opportunity for questions and clarification was provided.       Patient transported with:   Monitor

## 2022-09-19 NOTE — PROGRESS NOTES
Progress Note      9/19/2022 9:57 AM  NAME: Lizzie Dominique   MRN:  [id number]   Admit Diagnosis: Atrial fibrillation with rapid ventricular response [facility]) [I48.91]          Assessment:     Problem list:   Atrial fibrillation with rapid ventricular rate, s/p DCCV to NSR on 5/99   Systolic heart failure with EF 25-40%   Moderate, moderate to severe mitral regurgitation, with prolapsed posterior mitral valve leaflet and anteriorly directed MR   Hypothyroidism, elevated TSH 9.6, T4 within normal limit  Elevated BNP, dyspnea at night times, mild acute on chronic congestive heart failure with rapid ventricular rate  Leukocytosis         Recommendations:     Change dilt to metoprolol 25 mg po BID in the setting of systolic heart failure   Continue anticoagulation, discussed risk and benefit of anticoagulation  Will need ischemic eval for cardiomyopathy - he agreed to proceed with C   Add spironolactone   Will add losartan if BP toelerates   Repeat echo as OP to reassess MR  If has recurrent afib then will consider Amiodarone        Thank you for this consult and allowing me to take part in this patients care. Please call with questions. Subjective:     HPI:  No CP or SOB   S/p RONALD and DCCV     Objective:      Physical Exam:    Last 24hrs VS reviewed since prior progress note.  Most recent are:    Visit Vitals  /68 (BP 1 Location: Right upper arm, BP Patient Position: At rest)   Pulse 81   Temp 97.8 °F (36.6 °C)   Resp 18   Ht 6' (1.829 m)   Wt 111.1 kg (245 lb)   SpO2 94%   BMI 33.23 kg/m²       Intake/Output Summary (Last 24 hours) at 9/19/2022 1704  Last data filed at 9/19/2022 1115  Gross per 24 hour   Intake 0 ml   Output 450 ml   Net -450 ml         General: Alert and oriented x3, no acute distress   Neck: Supple   Respiratory: No respiratory distress, clear lung sound   Cardiovascular: Irregular rate rhythm, S1S2, no murmur   Abdomen: soft, non tender, non distended   Neuro: moves all extremities, oriented x3   Skin: warm and dry   Extremity: no edema, warm to touch      Data Review    Telemetry: aFIB         Lab Data Personally Reviewed:    Recent Labs     09/19/22 0027 09/18/22 0247   WBC 10.5 11.6*   HGB 13.2 13.6   HCT 39.7 41.2    176     No results for input(s): INR, PTP, APTT, INREXT, INREXT in the last 72 hours. Recent Labs     09/19/22 0027 09/18/22 0247 09/17/22  0046 09/16/22  1820    140 141 138   K 3.8 3.7 3.8 4.0   * 108 109* 109*   CO2 24 25 24 21   BUN 20 19 15 14   CREA 1.26 1.29 1.27 1.28   * 105* 129* 131*   CA 8.8 9.0 9.2 9.4   MG  --   --  2.4 2.5*     No results for input(s): CPK, CKNDX, TROIQ in the last 72 hours. No lab exists for component: CPKMB  No results found for: CHOL, CHOLX, CHLST, CHOLV, HDL, HDLP, LDL, LDLC, DLDLP, Monna Woodward, CHHD, CHHDX    Recent Labs     09/18/22 0247 09/17/22  0046 09/16/22  1820   AP 63 72 82   TP 6.5 7.0 7.4   ALB 3.3* 3.6 3.8   GLOB 3.2 3.4 3.6     No results for input(s): PH, PCO2, PO2 in the last 72 hours.     Medications Personally Reviewed:    Current Facility-Administered Medications   Medication Dose Route Frequency    dilTIAZem IR (CARDIZEM) tablet 60 mg  60 mg Oral AC&HS    enoxaparin (LOVENOX) injection 120 mg  1 mg/kg SubCUTAneous Q12H    sodium chloride (NS) flush 5-40 mL  5-40 mL IntraVENous Q8H    sodium chloride (NS) flush 5-40 mL  5-40 mL IntraVENous PRN    acetaminophen (TYLENOL) tablet 650 mg  650 mg Oral Q6H PRN    Or    acetaminophen (TYLENOL) suppository 650 mg  650 mg Rectal Q6H PRN    polyethylene glycol (MIRALAX) packet 17 g  17 g Oral DAILY PRN    ondansetron (ZOFRAN ODT) tablet 4 mg  4 mg Oral Q8H PRN    Or    ondansetron (ZOFRAN) injection 4 mg  4 mg IntraVENous Q6H PRN              Chava Avendano MD

## 2022-09-19 NOTE — PROGRESS NOTES
1900 Report received from [facility], [facility]. SBAR, Kardex, Recent Results, or Cardiac Rhythm afib  were discussed, RN assumed care of the pt. Genevieve Fountain RN    Problem: Falls - Risk of  Goal: *Absence of Falls  Description: Document Juarez Macdonald Fall Risk and appropriate interventions in the flowsheet.   Outcome: Progressing Towards Goal  Note: Fall Risk Interventions:            Medication Interventions: Evaluate medications/consider consulting pharmacy, Patient to call before getting OOB, Teach patient to arise slowly         History of Falls Interventions: Assess for delayed presentation/identification of injury for 48 hrs (comment for end date), Vital signs minimum Q4HRs X 24 hrs (comment for end date)         Problem: Patient Education: Go to Patient Education Activity  Goal: Patient/Family Education  Outcome: Progressing Towards Goal

## 2022-09-20 LAB
ALBUMIN SERPL-MCNC: 3.1 G/DL (ref 3.5–5)
ALBUMIN/GLOB SERPL: 0.9 {RATIO} (ref 1.1–2.2)
ALP SERPL-CCNC: 65 U/L (ref 45–117)
ALT SERPL-CCNC: 114 U/L (ref 12–78)
ANION GAP SERPL CALC-SCNC: 7 MMOL/L (ref 5–15)
AST SERPL-CCNC: 46 U/L (ref 15–37)
ATRIAL RATE: 85 BPM
BILIRUB SERPL-MCNC: 1.3 MG/DL (ref 0.2–1)
BUN SERPL-MCNC: 16 MG/DL (ref 6–20)
BUN/CREAT SERPL: 14 (ref 12–20)
CALCIUM SERPL-MCNC: 9 MG/DL (ref 8.5–10.1)
CALCULATED R AXIS, ECG10: 5 DEGREES
CALCULATED T AXIS, ECG11: -105 DEGREES
CHLORIDE SERPL-SCNC: 109 MMOL/L (ref 97–108)
CO2 SERPL-SCNC: 25 MMOL/L (ref 21–32)
CREAT SERPL-MCNC: 1.13 MG/DL (ref 0.7–1.3)
DIAGNOSIS, 93000: NORMAL
GLOBULIN SER CALC-MCNC: 3.3 G/DL (ref 2–4)
GLUCOSE SERPL-MCNC: 127 MG/DL (ref 65–100)
P-R INTERVAL, ECG05: 176 MS
POTASSIUM SERPL-SCNC: 3.7 MMOL/L (ref 3.5–5.1)
PROT SERPL-MCNC: 6.4 G/DL (ref 6.4–8.2)
Q-T INTERVAL, ECG07: 440 MS
QRS DURATION, ECG06: 112 MS
QTC CALCULATION (BEZET), ECG08: 523 MS
SODIUM SERPL-SCNC: 141 MMOL/L (ref 136–145)
THYROGLOB AB SERPL-ACNC: <1 IU/ML (ref 0–0.9)
THYROPEROXIDASE AB SERPL-ACNC: 12 IU/ML (ref 0–34)
TSH RECEP AB SER-ACNC: <1.1 IU/L (ref 0–1.75)
VENTRICULAR RATE, ECG03: 85 BPM

## 2022-09-20 PROCEDURE — 36415 COLL VENOUS BLD VENIPUNCTURE: CPT

## 2022-09-20 PROCEDURE — 77030019698 HC SYR ANGI MDLON MRTM -A: Performed by: STUDENT IN AN ORGANIZED HEALTH CARE EDUCATION/TRAINING PROGRAM

## 2022-09-20 PROCEDURE — C1769 GUIDE WIRE: HCPCS | Performed by: STUDENT IN AN ORGANIZED HEALTH CARE EDUCATION/TRAINING PROGRAM

## 2022-09-20 PROCEDURE — 74011250636 HC RX REV CODE- 250/636: Performed by: STUDENT IN AN ORGANIZED HEALTH CARE EDUCATION/TRAINING PROGRAM

## 2022-09-20 PROCEDURE — 77030012468 HC VLV BLEEDBK CNTRL ABBT -B: Performed by: STUDENT IN AN ORGANIZED HEALTH CARE EDUCATION/TRAINING PROGRAM

## 2022-09-20 PROCEDURE — 77010033678 HC OXYGEN DAILY

## 2022-09-20 PROCEDURE — 77030008543 HC TBNG MON PRSS MRTM -A: Performed by: STUDENT IN AN ORGANIZED HEALTH CARE EDUCATION/TRAINING PROGRAM

## 2022-09-20 PROCEDURE — 93571 IV DOP VEL&/PRESS C FLO 1ST: CPT | Performed by: STUDENT IN AN ORGANIZED HEALTH CARE EDUCATION/TRAINING PROGRAM

## 2022-09-20 PROCEDURE — 74011000250 HC RX REV CODE- 250: Performed by: INTERNAL MEDICINE

## 2022-09-20 PROCEDURE — 77030015766: Performed by: STUDENT IN AN ORGANIZED HEALTH CARE EDUCATION/TRAINING PROGRAM

## 2022-09-20 PROCEDURE — 74011000250 HC RX REV CODE- 250: Performed by: STUDENT IN AN ORGANIZED HEALTH CARE EDUCATION/TRAINING PROGRAM

## 2022-09-20 PROCEDURE — 99153 MOD SED SAME PHYS/QHP EA: CPT | Performed by: STUDENT IN AN ORGANIZED HEALTH CARE EDUCATION/TRAINING PROGRAM

## 2022-09-20 PROCEDURE — B2111ZZ FLUOROSCOPY OF MULTIPLE CORONARY ARTERIES USING LOW OSMOLAR CONTRAST: ICD-10-PCS | Performed by: STUDENT IN AN ORGANIZED HEALTH CARE EDUCATION/TRAINING PROGRAM

## 2022-09-20 PROCEDURE — 74011250637 HC RX REV CODE- 250/637: Performed by: STUDENT IN AN ORGANIZED HEALTH CARE EDUCATION/TRAINING PROGRAM

## 2022-09-20 PROCEDURE — 77030019569 HC BND COMPR RAD TERU -B: Performed by: STUDENT IN AN ORGANIZED HEALTH CARE EDUCATION/TRAINING PROGRAM

## 2022-09-20 PROCEDURE — 2709999900 HC NON-CHARGEABLE SUPPLY: Performed by: STUDENT IN AN ORGANIZED HEALTH CARE EDUCATION/TRAINING PROGRAM

## 2022-09-20 PROCEDURE — 65270000046 HC RM TELEMETRY

## 2022-09-20 PROCEDURE — 77030010221 HC SPLNT WR POS TELE -B: Performed by: STUDENT IN AN ORGANIZED HEALTH CARE EDUCATION/TRAINING PROGRAM

## 2022-09-20 PROCEDURE — 74011000636 HC RX REV CODE- 636: Performed by: STUDENT IN AN ORGANIZED HEALTH CARE EDUCATION/TRAINING PROGRAM

## 2022-09-20 PROCEDURE — 80053 COMPREHEN METABOLIC PANEL: CPT

## 2022-09-20 PROCEDURE — 92928 PRQ TCAT PLMT NTRAC ST 1 LES: CPT | Performed by: STUDENT IN AN ORGANIZED HEALTH CARE EDUCATION/TRAINING PROGRAM

## 2022-09-20 PROCEDURE — 74011250636 HC RX REV CODE- 250/636: Performed by: GENERAL ACUTE CARE HOSPITAL

## 2022-09-20 PROCEDURE — 4A023N7 MEASUREMENT OF CARDIAC SAMPLING AND PRESSURE, LEFT HEART, PERCUTANEOUS APPROACH: ICD-10-PCS | Performed by: STUDENT IN AN ORGANIZED HEALTH CARE EDUCATION/TRAINING PROGRAM

## 2022-09-20 PROCEDURE — C1894 INTRO/SHEATH, NON-LASER: HCPCS | Performed by: STUDENT IN AN ORGANIZED HEALTH CARE EDUCATION/TRAINING PROGRAM

## 2022-09-20 PROCEDURE — 93458 L HRT ARTERY/VENTRICLE ANGIO: CPT | Performed by: STUDENT IN AN ORGANIZED HEALTH CARE EDUCATION/TRAINING PROGRAM

## 2022-09-20 PROCEDURE — 99152 MOD SED SAME PHYS/QHP 5/>YRS: CPT | Performed by: STUDENT IN AN ORGANIZED HEALTH CARE EDUCATION/TRAINING PROGRAM

## 2022-09-20 PROCEDURE — C1887 CATHETER, GUIDING: HCPCS | Performed by: STUDENT IN AN ORGANIZED HEALTH CARE EDUCATION/TRAINING PROGRAM

## 2022-09-20 PROCEDURE — 77030040392 HC DRSG OPTIFOAM MDII -A: Performed by: STUDENT IN AN ORGANIZED HEALTH CARE EDUCATION/TRAINING PROGRAM

## 2022-09-20 RX ORDER — SODIUM CHLORIDE 0.9 % (FLUSH) 0.9 %
5-40 SYRINGE (ML) INJECTION AS NEEDED
Status: DISCONTINUED | OUTPATIENT
Start: 2022-09-20 | End: 2022-09-21 | Stop reason: HOSPADM

## 2022-09-20 RX ORDER — FENTANYL CITRATE 50 UG/ML
INJECTION, SOLUTION INTRAMUSCULAR; INTRAVENOUS AS NEEDED
Status: DISCONTINUED | OUTPATIENT
Start: 2022-09-20 | End: 2022-09-20 | Stop reason: HOSPADM

## 2022-09-20 RX ORDER — HEPARIN SODIUM 200 [USP'U]/100ML
INJECTION, SOLUTION INTRAVENOUS
Status: COMPLETED | OUTPATIENT
Start: 2022-09-20 | End: 2022-09-20

## 2022-09-20 RX ORDER — MIDAZOLAM HYDROCHLORIDE 1 MG/ML
INJECTION, SOLUTION INTRAMUSCULAR; INTRAVENOUS AS NEEDED
Status: DISCONTINUED | OUTPATIENT
Start: 2022-09-20 | End: 2022-09-20 | Stop reason: HOSPADM

## 2022-09-20 RX ORDER — HEPARIN SODIUM 1000 [USP'U]/ML
INJECTION, SOLUTION INTRAVENOUS; SUBCUTANEOUS AS NEEDED
Status: DISCONTINUED | OUTPATIENT
Start: 2022-09-20 | End: 2022-09-20 | Stop reason: HOSPADM

## 2022-09-20 RX ORDER — LIDOCAINE HYDROCHLORIDE 10 MG/ML
INJECTION, SOLUTION EPIDURAL; INFILTRATION; INTRACAUDAL; PERINEURAL AS NEEDED
Status: DISCONTINUED | OUTPATIENT
Start: 2022-09-20 | End: 2022-09-20 | Stop reason: HOSPADM

## 2022-09-20 RX ORDER — POTASSIUM CHLORIDE 750 MG/1
40 TABLET, FILM COATED, EXTENDED RELEASE ORAL
Status: COMPLETED | OUTPATIENT
Start: 2022-09-20 | End: 2022-09-20

## 2022-09-20 RX ORDER — SODIUM CHLORIDE 0.9 % (FLUSH) 0.9 %
5-40 SYRINGE (ML) INJECTION EVERY 8 HOURS
Status: DISCONTINUED | OUTPATIENT
Start: 2022-09-20 | End: 2022-09-21 | Stop reason: HOSPADM

## 2022-09-20 RX ORDER — FUROSEMIDE 10 MG/ML
40 INJECTION INTRAMUSCULAR; INTRAVENOUS DAILY
Status: DISCONTINUED | OUTPATIENT
Start: 2022-09-20 | End: 2022-09-21

## 2022-09-20 RX ADMIN — SODIUM CHLORIDE, PRESERVATIVE FREE 10 ML: 5 INJECTION INTRAVENOUS at 13:01

## 2022-09-20 RX ADMIN — METOPROLOL TARTRATE 25 MG: 25 TABLET, FILM COATED ORAL at 22:00

## 2022-09-20 RX ADMIN — SPIRONOLACTONE 25 MG: 25 TABLET ORAL at 08:39

## 2022-09-20 RX ADMIN — POTASSIUM CHLORIDE 40 MEQ: 750 TABLET, FILM COATED, EXTENDED RELEASE ORAL at 12:58

## 2022-09-20 RX ADMIN — ENOXAPARIN SODIUM 120 MG: 150 INJECTION SUBCUTANEOUS at 12:58

## 2022-09-20 RX ADMIN — FUROSEMIDE 40 MG: 10 INJECTION, SOLUTION INTRAMUSCULAR; INTRAVENOUS at 12:58

## 2022-09-20 RX ADMIN — METOPROLOL TARTRATE 25 MG: 25 TABLET, FILM COATED ORAL at 08:39

## 2022-09-20 RX ADMIN — SODIUM CHLORIDE, PRESERVATIVE FREE 10 ML: 5 INJECTION INTRAVENOUS at 22:01

## 2022-09-20 RX ADMIN — SODIUM CHLORIDE, PRESERVATIVE FREE 10 ML: 5 INJECTION INTRAVENOUS at 07:12

## 2022-09-20 NOTE — PROGRESS NOTES
1900 Report received from [address], [address] [facility]. Recent Results or Cardiac Rhythm SA FIRST DEGREE, PVC  were discussed, RN assumed care of the pt. Braden Nice RN    Problem: Falls - Risk of  Goal: *Absence of Falls  Description: Document [place] Cease Fall Risk and appropriate interventions in the flowsheet.   Outcome: Progressing Towards Goal  Note: Fall Risk Interventions:            Medication Interventions: Bed/chair exit alarm, Evaluate medications/consider consulting pharmacy, Patient to call before getting OOB, Teach patient to arise slowly         History of Falls Interventions: Vital signs minimum Q4HRs X 24 hrs (comment for end date), Assess for delayed presentation/identification of injury for 48 hrs (comment for end date)         Problem: Patient Education: Go to Patient Education Activity  Goal: Patient/Family Education  Outcome: Progressing Towards Goal

## 2022-09-20 NOTE — CARDIO/PULMONARY
Cardiac Rehab Note: chart review/referral     Cardiac cath 9/20/22:  \"Non obstructive CAD \"    Smoking history not assessed.

## 2022-09-20 NOTE — PROGRESS NOTES
Hospitalist Progress Note    NAME: Anthony Paulino   :  1940   MRN:  [id number]       Assessment / Plan:  Afib RVR, new onset. -CHADSVASC score 2  Status post Cardizem drip, now on cardizem oral  Continue with full dose lovenox, switch to NOAC prior to discharge  S/p RONALD cardioversion on . On sinus rhythm    Systolic heart failure:  AHRF d/t above:  CT chest showed bl pleural effusion  EF: 25-40%CXR showed vascular congestion  Cath done  showed non obstructive CAD  Has some petichial rash in his b/l groin, seen after preparation for cath, ?likely razor burn, will monitor  C/w iv diuresis  Procal low  Wean off oxygen as tolerated. Elevated TSH  --TSH level is elevated at 9.6, T4 wnl. Re check TFT in 4 weeks  Will check free T3, check Thyroid antibody panel  Patient is not on any treatment for hypothyroidism    Elevated LFTs  US and hepatitis panel neg  LFTs trending down  Ultrasound is within normal limits  ? could be d/t hepatic congestion from CHF    Code Status: Full code  Surrogate Decision Maker: Sister  DVT Prophylaxis: Lovenox   Baseline: From home, independent of ADLs  Recommended Disposition: Home w/Family  Anticipated Discharge Date:    Barriers: Cath today, needs iv diuretics/ cardiology clearance     Subjective:     Chief Complaint / Reason for Physician Visit  Follow up for Afib  In sinus rhythm, feels his breathing is better. No swelling  Seen after cath. Review of Systems:  Symptom Y/N Comments  Symptom Y/N Comments   Fever/Chills n   Chest Pain n    Poor Appetite n   Edema n    Cough n   Abdominal Pain     Sputum    Joint Pain     SOB/REED    Pruritis/Rash     Nausea/vomit    Tolerating PT/OT     Diarrhea    Tolerating Diet     Constipation    Other       Could NOT obtain due to:      Objective:     VITALS:   Last 24hrs VS reviewed since prior progress note.  Most recent are:  Patient Vitals for the past 24 hrs:   Temp Pulse Resp BP SpO2   22 1608 -- (!) 105 -- 103/66 92 %   09/20/22 1545 -- 80 -- 99/64 91 %   09/20/22 1500 98.1 °F (36.7 °C) 82 20 101/60 92 %   09/20/22 1445 -- 83 -- 103/64 94 %   09/20/22 1400 -- 82 -- 112/87 96 %   09/20/22 1330 -- 100 -- 98/70 93 %   09/20/22 1325 -- (!) 103 -- 92/64 93 %   09/20/22 1320 -- (!) 104 -- 94/66 94 %   09/20/22 1315 -- 77 -- 94/61 93 %   09/20/22 1300 -- 75 -- 98/63 94 %   09/20/22 1258 -- 74 -- 102/73 --   09/20/22 1245 -- 72 -- (!) 100/55 95 %   09/20/22 1230 -- 76 -- 100/72 97 %   09/20/22 1215 -- 78 -- 99/65 97 %   09/20/22 1200 -- 76 -- 105/69 96 %   09/20/22 1145 -- 80 -- 106/69 95 %   09/20/22 1130 -- 79 -- 103/64 95 %   09/20/22 1115 -- 74 -- 105/70 94 %   09/20/22 1100 97.7 °F (36.5 °C) 77 20 103/70 95 %   09/20/22 0839 -- 82 -- 121/74 --   09/20/22 0730 97.9 °F (36.6 °C) 95 25 (!) 114/92 96 %   09/20/22 0407 97.8 °F (36.6 °C) 69 18 107/71 99 %   09/19/22 2315 97.8 °F (36.6 °C) 88 17 107/76 96 %   09/19/22 2150 -- (!) 103 -- (!) 140/75 --   09/19/22 1920 98.9 °F (37.2 °C) 92 18 (!) 144/93 98 %   09/19/22 1830 -- 85 19 -- 97 %   09/19/22 1730 -- 82 18 -- 94 %       Intake/Output Summary (Last 24 hours) at 9/20/2022 1714  Last data filed at 9/20/2022 1330  Gross per 24 hour   Intake 340 ml   Output --   Net 340 ml        I had a face to face encounter and independently examined this patient on 9/20/2022, as outlined below:  PHYSICAL EXAM:  General: Awake, No acute distress  EENT:  EOMI. Anicteric sclerae. MMM  Resp:  CTA bilaterally, no wheezing or rales. No accessory muscle use  CV:  Regular  rhythm,  No edema  GI:  Soft, Non distended, Non tender. +Bowel sounds  Has petechial rash on b/l groin  Neurologic:  Alert and oriented X 3, normal speech,   Psych:   Not anxious nor agitated  Skin:  No rashes.   No jaundice    Reviewed most current lab test results and cultures  YES  Reviewed most current radiology test results   YES  Review and summation of old records today    NO  Reviewed patient's current orders and [facility] YES  PMH/SH reviewed - no change compared to H&P  ________________________________________________________________________  Care Plan discussed with:    Comments   Patient y    Family      RN y    Care Manager     Consultant                       y Multidiciplinary team rounds were held today with , nursing, pharmacist and clinical coordinator. Patient's plan of care was discussed; medications were reviewed and discharge planning was addressed. ________________________________________________________________________  Total NON critical care TIME:  37   Minutes    Total CRITICAL CARE TIME Spent:   Minutes non procedure based      Comments   >50% of visit spent in counseling and coordination of care     ________________________________________________________________________  Clark Rodriguez MD     Procedures: see electronic medical records for all procedures/Xrays and details which were not copied into this note but were reviewed prior to creation of Plan. LABS:  I reviewed today's most current labs and imaging studies.   Pertinent labs include:  Recent Labs     09/19/22  0027 09/18/22  0247   WBC 10.5 11.6*   HGB 13.2 13.6   HCT 39.7 41.2    176     Recent Labs     09/20/22  1311 09/19/22  0027 09/18/22  0247    140 140   K 3.7 3.8 3.7   * 109* 108   CO2 25 24 25   * 106* 105*   BUN 16 20 19   CREA 1.13 1.26 1.29   CA 9.0 8.8 9.0   ALB 3.1*  --  3.3*   TBILI 1.3*  --  1.3*   *  --  145*       Signed: Clark Rodriguez MD

## 2022-09-20 NOTE — PROGRESS NOTES
Problem: Falls - Risk of  Goal: *Absence of Falls  Description: Document Kev Fang Fall Risk and appropriate interventions in the flowsheet. Outcome: Progressing Towards Goal  Note: Fall Risk Interventions:            Medication Interventions: Bed/chair exit alarm, Patient to call before getting OOB, Teach patient to arise slowly         History of Falls Interventions: Door open when patient unattended         Problem: Patient Education: Go to Patient Education Activity  Goal: Patient/Family Education  Outcome: Progressing Towards Goal     Problem: Pressure Injury - Risk of  Goal: *Prevention of pressure injury  Description: Document Alban Scale and appropriate interventions in the flowsheet. Outcome: Progressing Towards Goal  Note: Pressure Injury Interventions:             Activity Interventions: Increase time out of bed, Pressure redistribution bed/mattress(bed type)    Mobility Interventions: HOB 30 degrees or less, Pressure redistribution bed/mattress (bed type)    Nutrition Interventions: Document food/fluid/supplement intake, Offer support with meals,snacks and hydration                     Problem: Patient Education: Go to Patient Education Activity  Goal: Patient/Family Education  Outcome: Progressing Towards Goal

## 2022-09-20 NOTE — PROGRESS NOTES
Physician Progress Note      PATIENT:               David Armstrong  CSN #:                  [id number]  :                       1940  ADMIT DATE:       2022 5:41 PM  [address] [place] DATE:  RESPONDING  PROVIDER #:        Erick Olmstead MD          QUERY TEXT:    Pt admitted with Palpitations and has Systolic heart failure with EF 25-40%  documented in Cardiology consult dated . If possible, please document in progress notes and discharge summary further specificity regarding the acuity of CHF:    The medical record reflects the following:  Risk Factors: C/o palpitations  Clinical Indicators: hr: 138; rr: 31; bp: 158/97. ....trop. hs: 36 ^ 45; pBNP: 3,167. ...cxr: Diffuse interstitial prominence which could represent chronic lung changes, pulmonary vascular congestion and/or atypical infectious process. .... ct chest: Resolved pulmonary edema. Pleural effusions. .. Jamee Philip Jamee Philip Echo RONALD w/ poss. Cardioversion: Left Ventricle: Moderately reduced left ventricular systolic function with a visually estimated EF of 35 - 40%. Left ventricle size is normal. Increased wall thickness. Global hypokinesis present. Jamee Philip Jamee Philip Jamee Philip Noted per Cards: mild acute on chronic congestive heart failure with rapid ventricular rate    Treatment: Trop. hs; pBNP: CXR; CT Chest; Cards consult; Echo RONALD; IV Cardizem gtt; Thank you,    Amor Driscoll  [facility]  Options provided:  -- Acute on Chronic Systolic CHF/HFrEF  -- Acute on Chronic Systolic and Diastolic CHF  -- Acute Systolic CHF/HFrEF  -- Acute Systolic and Diastolic CHF  -- Chronic Systolic CHF/HFrEF  -- Chronic Systolic and Diastolic CHF  -- Other - I will add my own diagnosis  -- Disagree - Not applicable / Not valid  -- Disagree - Clinically unable to determine / Unknown  -- Refer to Clinical Documentation Reviewer    PROVIDER RESPONSE TEXT:    This patient is in acute on chronic systolic CHF/HFrEF.     Query created by: Guido Kaur on 2022 6:44 PM      Electronically signed by:  Leandro Vega MICHAEL ESCOBAR 9/20/2022 7:40 AM

## 2022-09-20 NOTE — PROGRESS NOTES
Brief Procedure Note:         Indication:   Cardiomyopathy   Systolic heart failure new onset   MR    Procedure:   LHC, Cors   IFR of LAD    Complications: None   Blood loss: Minimal   Condition: Stable     Brief procedure Result:   Non obstructive CAD   Moderate LAD and ostial D2 disease not significant by IFR   Severely elevated LVEDP     Recommendations:   Diuresis with IV lasix   HF meds optimization   Reduced EF could be due to Afib with RVR   Does have MR with prolapse leaflet   Will reassess with repeat echo as OP       Full note and recommendations to follow. '

## 2022-09-20 NOTE — PROGRESS NOTES
0746 Bedside and Verbal shift change report given to Ayan Mckinney RN and Michael Rodriguez RN (oncoming nurse) by Thomas Driscoll RN (offgoing nurse). Report included the following information SBAR, Kardex, ED Summary, Intake/Output, MAR, Recent Results, and Cardiac Rhythm Sinus Rhythm, 1 degree AV block with PVC.     5838 Patient had 10 beats of Vtach per telemetry. Patient asymptomatic and vitals stable. Notified TYE Jackson MD - received verbal order for CMP. Patient off the unit to cath lab.     1005 Called to cath lab regarding new abrasion/scabs to patient's bilateral groin area. Mayi Poe MD made aware    1100 Patient returned from cath lab, assessment completed, TR band on at 12cc, site clean, dry, intact. Patient resting comfortably in bed, vital signs stable.      1930 Report given to Lalito Hylton RN

## 2022-09-21 VITALS
HEIGHT: 72 IN | OXYGEN SATURATION: 97 % | TEMPERATURE: 97.8 F | WEIGHT: 245 LBS | BODY MASS INDEX: 33.18 KG/M2 | RESPIRATION RATE: 18 BRPM | HEART RATE: 79 BPM | SYSTOLIC BLOOD PRESSURE: 103 MMHG | DIASTOLIC BLOOD PRESSURE: 61 MMHG

## 2022-09-21 LAB
ALBUMIN SERPL-MCNC: 3.1 G/DL (ref 3.5–5)
ALBUMIN/GLOB SERPL: 1 {RATIO} (ref 1.1–2.2)
ALP SERPL-CCNC: 68 U/L (ref 45–117)
ALT SERPL-CCNC: 148 U/L (ref 12–78)
ANION GAP SERPL CALC-SCNC: 7 MMOL/L (ref 5–15)
AST SERPL-CCNC: 75 U/L (ref 15–37)
BILIRUB SERPL-MCNC: 1.4 MG/DL (ref 0.2–1)
BUN SERPL-MCNC: 17 MG/DL (ref 6–20)
BUN/CREAT SERPL: 15 (ref 12–20)
CALCIUM SERPL-MCNC: 8.4 MG/DL (ref 8.5–10.1)
CHLORIDE SERPL-SCNC: 112 MMOL/L (ref 97–108)
CO2 SERPL-SCNC: 23 MMOL/L (ref 21–32)
CREAT SERPL-MCNC: 1.15 MG/DL (ref 0.7–1.3)
ERYTHROCYTE [DISTWIDTH] IN BLOOD BY AUTOMATED COUNT: 15.4 % (ref 11.5–14.5)
GLOBULIN SER CALC-MCNC: 3.1 G/DL (ref 2–4)
GLUCOSE SERPL-MCNC: 94 MG/DL (ref 65–100)
HCT VFR BLD AUTO: 41.8 % (ref 36.6–50.3)
HGB BLD-MCNC: 13.3 G/DL (ref 12.1–17)
MCH RBC QN AUTO: 31.1 PG (ref 26–34)
MCHC RBC AUTO-ENTMCNC: 31.8 G/DL (ref 30–36.5)
MCV RBC AUTO: 97.7 FL (ref 80–99)
NRBC # BLD: 0 K/UL (ref 0–0.01)
NRBC BLD-RTO: 0 PER 100 WBC
PLATELET # BLD AUTO: 189 K/UL (ref 150–400)
PMV BLD AUTO: 11.4 FL (ref 8.9–12.9)
POTASSIUM SERPL-SCNC: 3.2 MMOL/L (ref 3.5–5.1)
PROT SERPL-MCNC: 6.2 G/DL (ref 6.4–8.2)
RBC # BLD AUTO: 4.28 M/UL (ref 4.1–5.7)
SODIUM SERPL-SCNC: 142 MMOL/L (ref 136–145)
WBC # BLD AUTO: 8.9 K/UL (ref 4.1–11.1)

## 2022-09-21 PROCEDURE — 36415 COLL VENOUS BLD VENIPUNCTURE: CPT

## 2022-09-21 PROCEDURE — 74011000250 HC RX REV CODE- 250: Performed by: STUDENT IN AN ORGANIZED HEALTH CARE EDUCATION/TRAINING PROGRAM

## 2022-09-21 PROCEDURE — 85027 COMPLETE CBC AUTOMATED: CPT

## 2022-09-21 PROCEDURE — 80053 COMPREHEN METABOLIC PANEL: CPT

## 2022-09-21 PROCEDURE — 74011250637 HC RX REV CODE- 250/637: Performed by: STUDENT IN AN ORGANIZED HEALTH CARE EDUCATION/TRAINING PROGRAM

## 2022-09-21 PROCEDURE — 74011250636 HC RX REV CODE- 250/636: Performed by: STUDENT IN AN ORGANIZED HEALTH CARE EDUCATION/TRAINING PROGRAM

## 2022-09-21 RX ORDER — FUROSEMIDE 20 MG/1
20 TABLET ORAL DAILY
Qty: 30 TABLET | Refills: 0 | Status: SHIPPED | OUTPATIENT
Start: 2022-09-21

## 2022-09-21 RX ORDER — ATORVASTATIN CALCIUM 20 MG/1
20 TABLET, FILM COATED ORAL
Qty: 60 TABLET | Refills: 0 | Status: SHIPPED | OUTPATIENT
Start: 2022-09-21

## 2022-09-21 RX ORDER — VALSARTAN 40 MG/1
20 TABLET ORAL DAILY
Status: DISCONTINUED | OUTPATIENT
Start: 2022-09-21 | End: 2022-09-21 | Stop reason: HOSPADM

## 2022-09-21 RX ORDER — ATORVASTATIN CALCIUM 20 MG/1
20 TABLET, FILM COATED ORAL
Status: DISCONTINUED | OUTPATIENT
Start: 2022-09-21 | End: 2022-09-21 | Stop reason: HOSPADM

## 2022-09-21 RX ORDER — METOPROLOL TARTRATE 25 MG/1
25 TABLET, FILM COATED ORAL EVERY 12 HOURS
Qty: 30 TABLET | Refills: 0 | Status: SHIPPED | OUTPATIENT
Start: 2022-09-21

## 2022-09-21 RX ORDER — FUROSEMIDE 20 MG/1
20 TABLET ORAL DAILY
Status: DISCONTINUED | OUTPATIENT
Start: 2022-09-22 | End: 2022-09-21 | Stop reason: HOSPADM

## 2022-09-21 RX ORDER — VALSARTAN 40 MG/1
20 TABLET ORAL DAILY
Qty: 30 TABLET | Refills: 0 | Status: SHIPPED | OUTPATIENT
Start: 2022-09-22

## 2022-09-21 RX ORDER — POTASSIUM CHLORIDE 750 MG/1
40 TABLET, FILM COATED, EXTENDED RELEASE ORAL ONCE
Status: COMPLETED | OUTPATIENT
Start: 2022-09-21 | End: 2022-09-21

## 2022-09-21 RX ORDER — SPIRONOLACTONE 25 MG/1
25 TABLET ORAL DAILY
Qty: 30 TABLET | Refills: 0 | Status: SHIPPED | OUTPATIENT
Start: 2022-09-22

## 2022-09-21 RX ADMIN — VALSARTAN 20 MG: 40 TABLET, FILM COATED ORAL at 10:57

## 2022-09-21 RX ADMIN — SPIRONOLACTONE 25 MG: 25 TABLET ORAL at 08:03

## 2022-09-21 RX ADMIN — METOPROLOL TARTRATE 25 MG: 25 TABLET, FILM COATED ORAL at 08:03

## 2022-09-21 RX ADMIN — SODIUM CHLORIDE, PRESERVATIVE FREE 10 ML: 5 INJECTION INTRAVENOUS at 06:38

## 2022-09-21 RX ADMIN — APIXABAN 5 MG: 5 TABLET, FILM COATED ORAL at 11:54

## 2022-09-21 RX ADMIN — POTASSIUM CHLORIDE 40 MEQ: 750 TABLET, FILM COATED, EXTENDED RELEASE ORAL at 11:54

## 2022-09-21 RX ADMIN — FUROSEMIDE 40 MG: 10 INJECTION, SOLUTION INTRAMUSCULAR; INTRAVENOUS at 08:03

## 2022-09-21 NOTE — DISCHARGE SUMMARY
Hospitalist Discharge Summary     Patient ID:  Porsha Sullivan  [id number]  81 y.o.  1940    PCP on record: None    Admit date: 9/16/2022  Discharge date and time: 9/21/2022      DISCHARGE DIAGNOSIS:          CONSULTATIONS:  IP CONSULT TO CARDIOLOGY    Excerpted HPI from H&P of Anne Hatfield MD:    Porsha Sullivan is a 81 y.o.  male who presents with no significant past medical history is coming the hospital chief complaints of palpitations. Patient reports that he has not seen a physician in the last 20 years and started having some palpitations which are irregular, fast etc. the thumping sensation in the chest.  Does not report any shortness of breath, cough or phlegm. Does not report any chest pain. Denies any syncopal episodes. Denies any abdominal pain, nausea or vomiting.  ______________________________________________________________________  DISCHARGE SUMMARY/HOSPITAL COURSE:  for full details see H&P, daily progress notes, labs, consult notes. Afib RVR, new onset. -CHADSVASC score 2  Status post Cardizem drip, now on metoprolol  S/p RONALD cardioversion on 9/19. Now in sinus rhythm  Cont metoprolol   Change lovenox to Apixban   Cont spironolactone   Given IV lasix for elevated LVEDP, change to PO today 20 mg daily   Add low dose valsartan   Added lipitor for non obstructive CAD  Will repeat echo in 3 months to reassess LVEF and MR      Systolic heart failure:  AHRF d/t above:  CT chest showed bl pleural effusion  EF: 25-40%CXR showed vascular congestion  Cath done 9/20 showed non obstructive CAD  C/w iv diuresis, transition to lasix 20 mg PO daily and aldactone  Procal low  Weaned off oxygen as tolerated. Elevated TSH  --TSH level is elevated at 9.6, T4 wnl.  Re check TFT in 4 weeks  Will check free T3, check Thyroid antibody panel  Patient is not on any treatment for hypothyroidism     Elevated LFTs  US and hepatitis panel neg  LFTs trending down  Ultrasound is within normal limits  ? could be d/t hepatic congestion from CHF        _______________________________________________________________________  Patient seen and examined by me on discharge day. Pertinent Findings:  Gen:    Not in distress  Chest: Clear lungs  CVS:   Regular rhythm. No edema  Abd:  Soft, not distended, not tender  Neuro:  Alert, Oriented x 4, grossly non focal exam  _______________________________________________________________________  DISCHARGE MEDICATIONS:   Current Discharge Medication List        START taking these medications    Details   metoprolol tartrate (LOPRESSOR) 25 mg tablet Take 1 Tablet by mouth every twelve (12) hours. Qty: 30 Tablet, Refills: 0  Start date: 9/21/2022      valsartan (DIOVAN) 40 mg tablet Take 0.5 Tablets by mouth daily. Qty: 30 Tablet, Refills: 0  Start date: 9/22/2022      spironolactone (ALDACTONE) 25 mg tablet Take 1 Tablet by mouth daily. Qty: 30 Tablet, Refills: 0  Start date: 9/22/2022      furosemide (LASIX) 20 mg tablet Take 1 Tablet by mouth daily. Qty: 30 Tablet, Refills: 0  Start date: 9/21/2022      atorvastatin (LIPITOR) 20 mg tablet Take 1 Tablet by mouth nightly. Qty: 60 Tablet, Refills: 0  Start date: 9/21/2022      apixaban (ELIQUIS) 5 mg tablet Take 1 Tablet by mouth every twelve (12) hours. Qty: 60 Tablet, Refills: 0  Start date: 9/21/2022             My Recommended Diet, Activity, Wound Care, and follow-up labs are listed in the patient's Discharge Insturctions which I have personally completed and reviewed.   Risk of deterioration: Moderate    Condition at Discharge:  Stable  _____________________________________________________________________    Disposition  Home with family, no needs  ____________________________________________________________________    Care Plan discussed with:   Patient, Family, RN, Care Manager, Consultant    ____________________________________________________________________    Code Status: Full Code  ____________________________________________________________________      Condition at Discharge:  Stable  _____________________________________________________________________  Follow up with:   PCP : None  Follow-up Information       Follow up With Specialties Details Why Isabelle Luevano MD Cardiovascular Disease Physician, Internal Medicine Physician Follow up in 2 week(s)  [address]  [address]  [address] [phone]                  Total time in minutes spent coordinating this discharge (includes going over instructions, follow-up, prescriptions, and preparing report for sign off to her PCP) :  35 minutes    Signed:  Farzaneh Navarro MD

## 2022-09-21 NOTE — PROGRESS NOTES
Problem: Falls - Risk of  Goal: *Absence of Falls  Description: Document Zhanna Blood Fall Risk and appropriate interventions in the flowsheet. 9/21/2022 1148 by Harshad Dillard RN  Outcome: Resolved/Met  9/21/2022 0855 by Harshad Dillard RN  Outcome: Progressing Towards Goal  Note: Fall Risk Interventions:            Medication Interventions: Bed/chair exit alarm, Evaluate medications/consider consulting pharmacy, Patient to call before getting OOB         History of Falls Interventions: Bed/chair exit alarm, Consult care management for discharge planning, Door open when patient unattended         Problem: Patient Education: Go to Patient Education Activity  Goal: Patient/Family Education  9/21/2022 1148 by Hrashad Dillard RN  Outcome: Resolved/Met  9/21/2022 0855 by Harshad Dillard RN  Outcome: Progressing Towards Goal     Problem: Pressure Injury - Risk of  Goal: *Prevention of pressure injury  Description: Document Alban Scale and appropriate interventions in the flowsheet. 9/21/2022 1148 by Harshad Dillard RN  Outcome: Resolved/Met  9/21/2022 0855 by Harshad Dillard RN  Outcome: Progressing Towards Goal  Note: Pressure Injury Interventions:             Activity Interventions: Increase time out of bed    Mobility Interventions: Assess need for specialty bed    Nutrition Interventions: Document food/fluid/supplement intake                     Problem: Patient Education: Go to Patient Education Activity  Goal: Patient/Family Education  9/21/2022 1148 by Harshad Dillard RN  Outcome: Resolved/Met  9/21/2022 0855 by Harshad Dillard RN  Outcome: Progressing Towards Goal

## 2022-09-21 NOTE — PROGRESS NOTES
Problem: Falls - Risk of  Goal: *Absence of Falls  Description: Document Janet Jada Fall Risk and appropriate interventions in the flowsheet. Outcome: Progressing Towards Goal  Note: Fall Risk Interventions:            Medication Interventions: Bed/chair exit alarm, Evaluate medications/consider consulting pharmacy, Patient to call before getting OOB         History of Falls Interventions: Bed/chair exit alarm, Consult care management for discharge planning, Door open when patient unattended         Problem: Patient Education: Go to Patient Education Activity  Goal: Patient/Family Education  Outcome: Progressing Towards Goal     Problem: Pressure Injury - Risk of  Goal: *Prevention of pressure injury  Description: Document Alban Scale and appropriate interventions in the flowsheet. Outcome: Progressing Towards Goal  Note: Pressure Injury Interventions:             Activity Interventions: Increase time out of bed    Mobility Interventions: Assess need for specialty bed    Nutrition Interventions: Document food/fluid/supplement intake                     Problem: Patient Education: Go to Patient Education Activity  Goal: Patient/Family Education  Outcome: Progressing Towards Goal

## 2022-09-21 NOTE — PROGRESS NOTES
1090:  Bedside shift change report given to [address] (oncoming nurse) by Justine Flannery (offgoing nurse). Report included the following information SBAR, Kardex, MAR, and Cardiac Rhythm NSR .      1318:  Received confirmation from Case Management patient okay to discharge from their standpoint. Patient waiting on ride. DISCHARGE SUMMARY FROM [facility] NURSE    The patient is stable for discharge. I have reviewed the discharge instructions with the patient. The patient verbalized understanding. All questions were fully answered. The patient verbalized no complaints. Hard scripts and medication handouts were given and reviewed with the patient. Appropriate educational materials and medication side effects teaching were also provided. Cardiac monitor and IV line(s) were removed. The following personal items collected during admission were returned to the patient/family  Home medications: None  Dental Appliance: Dental Appliances: None  Vision: Visual Aid: None  Hearing Aid: Hearing Aid: None  Jewelry: Jewelry: None  Clothing: Clothing: Shirt, Pants, Undergarments, At bedside  Other Valuables: Other Valuables: Cell Phone, With patient  Valuables sent to safe: There were no personal belongings, valuables or home medications left at patient's bedside,  or safe.

## 2022-09-21 NOTE — DISCHARGE INSTRUCTIONS
You need another ECHO in 3 months. You need repeat blood work to look at your liver function tests and thyroid level in the next month                     [address] [facility], [address] \[facility]\""   [phone]  [address]    [email or web address]    Patient Discharge Instructions    Yoni Farias / [id number] : 1940    Admitted 2022 Discharged: 2022       It is important that you take the medication exactly as they are prescribed. Keep your medication in the bottles provided by the pharmacist and keep a list of the medication names, dosages, and times to be taken in your wallet. Do not take other medications without consulting your doctor. BRING ALL OF YOUR MEDICINES TO YOUR OFFICE VISIT with Yariel Ham MD or my nurse practitioner. Follow-up with Yariel Ham MD or my nurse practitioner in 2 weeks. Cardiac Catheterization  Discharge Instructions    Transradial Catheterization Discharge Instructions (WRIST)    Discharge instructions: Your radial artery in your wrist was used for your cardiac catheterization. This site may be slightly bruised and sore following your procedure. Expect mild tingling or the hand and tenderness at the puncture site for up to 3 days. Excess movement of the wrist used should be avoided for the next 24-48 hours. No lifting over 2 pounds (approximately a ½ gallon of milk) with this arm for 24 hours. Keep the site of the procedure covered with a bandage for 24 hours. You may shower the day after your procedure. Do not take a tub bath or submerge the puncture site in water for 48 hours. No heavy impact activity/lifting > 10 pounds for 1 week. If bleeding of the wrist occurs at home:   If the site on your wrist where you had the catheterization procedure begins to bleed, do not panic. Place 1 or 2 fingers over the puncture site and hold pressure to stop the bleeding. You may be able to feel your pulse as you hold pressure. Lift your fingers after 5 minutes to see if the bleeding has stopped. Once the bleeding has stopped, gently wipe the wrist area clean and cover with a bandage. If the bleeding from your wrist does not stop after 15 minutes, or if there is a large amount of bleeding or spurting, call 911 immediately (do not drive yourself to the hospital). Other concerns: The site may be slightly bruised and sore following your procedure. Should any of the following occur, contact your physician immediately:   Any cool or coldness of the arm, discoloration over a large area, ongoing numbness or any abnormal sensations , moderate to severe pain or swelling in the arm. Redness, soreness, swelling, chills or fever, or colored drainage at the procedure site within 3-7 days after your procedure. If you have any further questions or concerns regarding your procedure please call the Cardiac Cath Lab office at [phone]. During regular business hours ask to speak to Dr. Brittney Osborne. During non-business hours the answering service will answer. Ask to speak to the physician on call for [facility] Cardiovascular Specialist.     Transfemoral Catheterization Discharge Instructions (GROIN)    Do not drive, operate any machinery, or sign any legal documents for 24 hours after your procedure. You must have someone to drive you home. You may take a shower 24 hours after your cardiac catheterization. Be sure to get the dressing wet and then remove it; gently wash the area with warm soapy water. Pat dry and leave open to air. To help prevent infections, be sure to keep the cath site clean and dry. No lotions, creams, powders, ointments, etc. in the cath site for approximately 1 week. Do not take a tub bath, get in a hot tub or swimming pool for approximately 5 days or until the cath site is completely healed. No strenuous activity or heavy lifting over 10 lbs. for 7 days.     Drink plenty of fluids for 24-48 hours after your cath to flush the contrast dye from your kidneys. No alcoholic beverages for 24 hours. You may resume your previous diet (low fat, low cholesterol) after your cath. After your cath, some bruising or discomfort is common during the healing process. Tylenol, 1-2 tablets every 6 hours as needed, is recommended if you experience any discomfort. If you experience any signs or symptoms of infection such as fever, chills, or poorly healing incision, persistent tenderness or swelling in the groin, redness and/or warmth to the touch, numbness, significant tingling or pain at the groin site or affected extremity, rash, drainage from the cath site, or if the leg feels tight or swollen, call your physician right away. If bleeding at the cath site occurs, take a clean gauze pad and apply direct pressure to the groin just above the puncture site. Call 911 immediately, and continue to apply direct pressure until an ambulance gets to your location. You may return to work  2  days after your cardiac cath if no groin bleeding. Information obtained by :  I understand that if any problems occur once I am at home I am to contact my physician. I understand and acknowledge receipt of the instructions indicated above. R.N.'s Signature                                                                  Date/Time                                                                                                                                              Patient or Representative Signature                                                          Date/Time      Sena Segura MD             [place] [address] [facility], [address] [phone]  [address]    [email or web address]

## 2022-09-21 NOTE — PROGRESS NOTES
Progress Note      9/21/2022 9:57 AM  NAME: Donna Hooks   MRN:  [id number]   Admit Diagnosis: Atrial fibrillation with rapid ventricular response [facility]) [I48.91]          Assessment:     Problem list:   Atrial fibrillation with rapid ventricular rate, s/p DCCV to NSR on 9/89   Systolic heart failure with EF 35-40%  Moderate non obstructive CAD, LAD 50-60% disease not significant by IFR   Moderate, moderate to severe mitral regurgitation, with prolapsed posterior mitral valve leaflet and anteriorly directed MR   Hypothyroidism, elevated TSH 9.6, T4 within normal limit  Elevated BNP, dyspnea at night times, mild acute on chronic congestive heart failure with rapid ventricular rate  Leukocytosis         Recommendations:     Cont metoprolol   Change lovenox to Apixban   Cont spironolactone   Given IV lasix for elevated LVEDP, change to PO today 20 mg daily   Add low dose valsartan   Add lipitor for non obstructive CAD  Will repeat echo in 3 months to reassess LVEF and MR     Okay to be discharged, return to clinic in 2-3 wks        Thank you for this consult and allowing me to take part in this patients care. Please call with questions. Subjective:     HPI:  No CP or SOB   LHC showed non obstructive CAD    Objective:      Physical Exam:    Last 24hrs VS reviewed since prior progress note.  Most recent are:    Visit Vitals  /61   Pulse 79   Temp 98 °F (36.7 °C)   Resp 20   Ht 6' (1.829 m)   Wt 111.1 kg (245 lb)   SpO2 98%   BMI 33.23 kg/m²       Intake/Output Summary (Last 24 hours) at 9/21/2022 1132  Last data filed at 9/21/2022 1036  Gross per 24 hour   Intake 340 ml   Output 2800 ml   Net -2460 ml         General: Alert and oriented x3, no acute distress   Neck: Supple   Respiratory: No respiratory distress, clear lung sound   Cardiovascular: Irregular rate rhythm, S1S2, SM  Abdomen: soft, non tender, non distended   Neuro: moves all extremities, oriented x3   Skin: warm and dry Extremity: no edema, warm to touch      Data Review    Telemetry: aFIB         Lab Data Personally Reviewed:    Recent Labs     09/21/22  0405 09/19/22  0027   WBC 8.9 10.5   HGB 13.3 13.2   HCT 41.8 39.7    181     No results for input(s): INR, PTP, APTT, INREXT, INREXT in the last 72 hours. Recent Labs     09/21/22  0405 09/20/22  1311 09/19/22  0027    141 140   K 3.2* 3.7 3.8   * 109* 109*   CO2 23 25 24   BUN 17 16 20   CREA 1.15 1.13 1.26   GLU 94 127* 106*   CA 8.4* 9.0 8.8     No results for input(s): CPK, CKNDX, TROIQ in the last 72 hours. No lab exists for component: CPKMB  No results found for: CHOL, CHOLX, CHLST, CHOLV, HDL, HDLP, LDL, LDLC, DLDLP, TGLX, TRIGL, TRIGP, CHHD, CHHDX    Recent Labs     09/21/22  0405 09/20/22  1311   AP 68 65   TP 6.2* 6.4   ALB 3.1* 3.1*   GLOB 3.1 3.3     No results for input(s): PH, PCO2, PO2 in the last 72 hours.     Medications Personally Reviewed:    Current Facility-Administered Medications   Medication Dose Route Frequency    valsartan (DIOVAN) tablet 20 mg  20 mg Oral DAILY    apixaban (ELIQUIS) tablet 5 mg  5 mg Oral Q12H    [START ON 9/22/2022] furosemide (LASIX) tablet 20 mg  20 mg Oral DAILY    potassium chloride SR (KLOR-CON 10) tablet 40 mEq  40 mEq Oral ONCE    sodium chloride (NS) flush 5-40 mL  5-40 mL IntraVENous Q8H    sodium chloride (NS) flush 5-40 mL  5-40 mL IntraVENous PRN    metoprolol tartrate (LOPRESSOR) tablet 25 mg  25 mg Oral Q12H    spironolactone (ALDACTONE) tablet 25 mg  25 mg Oral DAILY    sodium chloride (NS) flush 5-40 mL  5-40 mL IntraVENous Q8H    sodium chloride (NS) flush 5-40 mL  5-40 mL IntraVENous PRN    acetaminophen (TYLENOL) tablet 650 mg  650 mg Oral Q6H PRN    Or    acetaminophen (TYLENOL) suppository 650 mg  650 mg Rectal Q6H PRN    polyethylene glycol (MIRALAX) packet 17 g  17 g Oral DAILY PRN    ondansetron (ZOFRAN ODT) tablet 4 mg  4 mg Oral Q8H PRN    Or    ondansetron (ZOFRAN) injection 4 mg  4 mg IntraVENous Q6H PRN              Julisa Ruby MD

## (undated) DEVICE — SYRINGE ANGIO 10 CC BRL STD PRNT POLYCARB LT BLU MEDALLION

## (undated) DEVICE — OPTIFOAM GENTLE LIQUITRAP, SACRUM, 9X9: Brand: MEDLINE

## (undated) DEVICE — COPILOT BLEEDBACK CONTROL VALVE: Brand: COPILOT

## (undated) DEVICE — 3M™ TEGADERM™ TRANSPARENT FILM DRESSING FRAME STYLE, 1626W, 4 IN X 4-3/4 IN (10 CM X 12 CM), 50/CT 4CT/CASE: Brand: 3M™ TEGADERM™

## (undated) DEVICE — TR BAND RADIAL ARTERY COMPRESSION DEVICE: Brand: TR BAND

## (undated) DEVICE — PRESSURE GUIDEWIRE: Brand: COMET™ II

## (undated) DEVICE — TUBING PRSS MON L6IN PVC M FEM CONN

## (undated) DEVICE — GUIDEWIRE VASC L260CM 0.035IN J TIP L3MM PTFE FIX COR NAMIC

## (undated) DEVICE — HI-TORQUE VERSACORE FLOPPY GUIDE WIRE SYSTEM 145 CM: Brand: HI-TORQUE VERSACORE

## (undated) DEVICE — GLIDESHEATH SLENDER ACCESS KIT: Brand: GLIDESHEATH SLENDER

## (undated) DEVICE — SPLINT WR POS F/ARTERIAL ACC -- BX/10

## (undated) DEVICE — CATH GUID COR EB35 6FR 100CM -- LAUNCHER

## (undated) DEVICE — RADIFOCUS OPTITORQUE ANGIOGRAPHIC CATHETER: Brand: OPTITORQUE

## (undated) DEVICE — HEART CATH-MRMC: Brand: MEDLINE INDUSTRIES, INC.